# Patient Record
Sex: MALE | Race: WHITE
[De-identification: names, ages, dates, MRNs, and addresses within clinical notes are randomized per-mention and may not be internally consistent; named-entity substitution may affect disease eponyms.]

---

## 2019-07-03 ENCOUNTER — HOSPITAL ENCOUNTER (EMERGENCY)
Dept: HOSPITAL 75 - ER | Age: 29
Discharge: HOME | End: 2019-07-03
Payer: COMMERCIAL

## 2019-07-03 VITALS — WEIGHT: 185 LBS | BODY MASS INDEX: 29.03 KG/M2 | HEIGHT: 67 IN

## 2019-07-03 VITALS — DIASTOLIC BLOOD PRESSURE: 89 MMHG | SYSTOLIC BLOOD PRESSURE: 123 MMHG

## 2019-07-03 DIAGNOSIS — S80.212A: ICD-10-CM

## 2019-07-03 DIAGNOSIS — S40.811A: ICD-10-CM
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
## 2019-07-03 DIAGNOSIS — S06.0X0A: Primary | ICD-10-CM

## 2019-07-03 DIAGNOSIS — S80.211A: ICD-10-CM

## 2019-07-03 DIAGNOSIS — F17.210: ICD-10-CM

## 2019-07-03 DIAGNOSIS — R40.2142: ICD-10-CM

## 2019-07-03 DIAGNOSIS — S50.811A: ICD-10-CM

## 2019-07-03 DIAGNOSIS — S30.811A: ICD-10-CM

## 2019-07-03 DIAGNOSIS — S60.512A: ICD-10-CM

## 2019-07-03 DIAGNOSIS — R40.2362: ICD-10-CM

## 2019-07-03 DIAGNOSIS — R40.2252: ICD-10-CM

## 2019-07-03 PROCEDURE — 90715 TDAP VACCINE 7 YRS/> IM: CPT

## 2019-07-03 PROCEDURE — 72125 CT NECK SPINE W/O DYE: CPT

## 2019-07-03 PROCEDURE — 70450 CT HEAD/BRAIN W/O DYE: CPT

## 2019-07-03 PROCEDURE — 73610 X-RAY EXAM OF ANKLE: CPT

## 2019-07-03 NOTE — XMS REPORT
[facility]

                             Created on: 2015



Paul Hillman

External Reference #: [id number]

: 1990

Sex: Male



Demographics







                          Address                   [address] [place] [address]  [address]

 

                          Home Phone                [phone]

 

                          Preferred Language        Unknown

 

                          Marital Status            Unknown

 

                          Alevism Affiliation     Unknown

 

                          Race                      White

 

                          Ethnic Group              Not  or 





Author







                          Author                    KARTHIK LONG

 

                          [facility] [place]              [employer]

 

                          Address                   Unknown

 

                          Phone                     Unavailable







Care Team Providers







                    Care Team Member Name    Role                Phone

 

                    KARTHIK LONG       CP                  Unavailable



                                                                



Allergies, Adverse Reactions, Alerts

          





                    Substance           Reaction            Event Type

 

                    Wellbutrin Sr 150 Mg Tablet Extended Release    "panic attack"      Non Drug Allergy



                                                                                
       



Problems

          





             Problem Type    Condition    ICD-9 Code    Onset Dates    Condition Status

 

             Problem      Encounter for long-term (current) use of other medications    V58.69                    

Active

 

             Problem      Acute pharyngitis    462                       Active

 

             Problem      Depressive disorder, not elsewhere classified    311                       Active

 

             Problem      Problems related to high-risk sexual behavior    V69.2                     Active

 

             Problem      Esophageal reflux    530.81                    Active

 

             Problem      Urethral discharge    788.7                     Active

 

             Problem      Unspecified sinusitis (chronic)    473.9                     Active

 

             Problem      Other diseases of nasal cavity and sinuses    478.19                    Active

 

                    Problem             Other and unspecified noninfectious gastroenteritis and colitis    558.9 

                                                    Active

 

             Problem      Unspecified acute conjunctivitis    372.00                    Active

 

             Assessment    Sinusitis    473.9                     Active

 

             Assessment    Allergic rhinitis    477.9                     Active

 

             Problem      Streptococcal sore throat    034.0                     Active

 

             Assessment    Heartburn    787.1                     Active

 

             Problem      Unspecified acute nonsuppurative otitis media    381.00                    Active



                                                                                
                                                                                
                                                                  



Medications

          





        Medication    Code System    Code    Instructions    Start Date    End Date    Status    Dosage



 

           Azithromycin    NDC        [address]-65    250 MG Orally Once a day    Aug 27, 2015    Sept 01,

 2015                                               2 tablets  on the first day, then 1 tablet daily for 4 days

 

           PrednisoLONE    NDC        [address]-52    10 mg Orally 2 times a day    Aug 27, 2015    Sept

 01, 2015                                           as directed

 

             Omeprazole Magnesium    NDC          [address]31    20 MG Orally Once a day    2013 

                    Dec 17, 2013                            take 1 capsule by Oral route 1 time per day



                                                                                
                           



Procedures

          





                Procedure       Coding System    Code            Date

 

                Office Visit, Est Pt., Level 3    CPT-4           46173           Aug 27, 2015



                                                                                
                 



Vital Signs

          





                          Date/Time:                Aug 27, 2015

 

                          Temperature               98.6 F

 

                          Weight                    171.2 lbs

 

                          Height                    67 in

 

                          BMI                       26.81 Index

 

                          Blood Pressure Diastolic    80 mmHg

 

                          Blood Pressure Systolic    132 mmHg

 

                          Cardiac Monitoring Heart Rate    84 bpm



                                                                              



Results

          No Known Results                                                      
             



Summary Purpose

          eClinicalWorks Submission

## 2019-07-03 NOTE — XMS REPORT
[facility]

                             Created on: 2018



Paul Hillman

External Reference #: [id number]

: 1990

Sex: Male



Demographics







                          Address                   [address] [place]  [address]

 

                          Preferred Language        Unknown

 

                          Marital Status            Unknown

 

                          Faith Affiliation     Unknown

 

                          Race                      Unknown

 

                          Ethnic Group              Unknown





Author







                          Author                    VINAYAK DELACRUZ

 

                          Organization              [facility]

 

                          Address                   [address] [place]  [address]



 

                          Phone                     [phone]







Care Team Providers







                    Care Team Member Name    Role                Phone

 

                    VINAYAK DELACRUZ     Unavailable         [phone]







PROBLEMS

Unknown Problems



ALLERGIES







             Substance    Reaction     Event Type    Date         Status

 

                Wellbutrin Sr 150 Mg Tablet Extended Release    "panic attack"    Non Drug Allergy    

                            Active







ENCOUNTERS







                Encounter       Location        Date            Diagnosis

 

                          [address] [address]

                                        Spasm of thoracic back muscle M62.830

 

                          [facility]    [address] [address]

                                        Gastroenteritis K52.9

 

                          [address] [address]

                          19 Dec, 2017              Cough R05

 

                          [address] [address]

                                        Acute nasopharyngitis J00

 

                          [place] [address] [address]

                          28 Aug, 2017              Left upper quadrant pain R10.12

 

                          [address] [address]

                          02 Mar, 2017              Gastroenteritis K52.9 and Gastroesophageal reflux disease without

 esophagitis K21.9

 

                          [address] [address]

                                        Fever, unspecified fever cause R50.9 ; Sore throat J02.9 and Influenza

 J11.1

 

                          [address] [address]

                          11 Aug, 2016              Acute mucoid otitis media of right ear H65.111 ; Infected lesion 

in nose J34.89 and Gastroesophageal reflux disease without esophagitis K21.9

 

                          [facility] [place] [facility]    [address] [place] [address]

                          16 May, 2016              Acute non-recurrent frontal sinusitis J01.10

 

                          [facility]     [id number] [place] [address]

                          27 Aug, 2015              Allergic rhinitis 477.9 ; Sinusitis 473.9 and Heartburn 787.1

 

                          [facility]     [id number] [place] [address]

                                        Gastroenteritis 558.9

 

                          [facility]     [address] [address]

                                         

 

                          [facility]     [id number] [place] [address]

                                         

 

                          [facility]     [id number] [place] [address]

                          10 Mar, 2015               

 

                          [facility]     [id number] [place] [address]

                          10 Mar, 2015               

 

                          [facility]     [id number] [place] [address]

                          09 Dec, 2014               

 

                          [facility]     [id number] [place] [address]

                          09 Dec, 2014               

 

                          [facility]     [id number] [place] [address]

                          22 Oct, 2014               

 

                          [facility]     [id number] [place] [address]

                          22 Oct, 2014               

 

                          [facility]     [id number] [place] [address]

                                         

 

                          [facility]     [id number] [place] [address]

                                         

 

                          [facility]     [id number] [place] [address]

                                         

 

                          [facility] [place] [facility]     [address] [id number] [place] [address]

                                         

 

                          [facility] [place] [facility]     [address] [id number] [place] [address]

                                         

 

                          [facility] [place] [facility]     [address] [id number] [place] [address]

                          16 2014               

 

                          [facility] [place] [facility]     [address] [id number] [place] [address]

                          15 2014               

 

                          [facility] [place] [facility]     [address] [id number] [place] [address]

                          15 2014               

 

                          [facility] [place] [facility]     [address] [id number] [place] [address]

                          13 2014               

 

                          [facility] [place] [facility]     [address] [id number] [place] [address]

                          17 Dec, 2013               

 

                          [facility] [place] [facility]     [address] [id number] [place] [address]

                          17 Dec, 2013               

 

                          [facility] [place] [facility]     [address] [id number] [place] [address]

                          17 Sep, 2013               

 

                          [facility] [place] [facility]     [address] [id number] [place] [address]

                          09 Sep, 2013               

 

                          [facility] [place] [facility]     [address] [id number] [place] [address]

                          07 Sep, 2013               

 

                          [facility] [place] [facility]     [address] [id number] [place] [address]

                          03 Sep, 2013               

 

                          [facility] [place] [facility]     [address] [id number] [place] [address]

                          02 Sep, 2013               

 

                          [facility] [place] [facility]     [address] [id number] [place] [address]

                          28 Aug, 2013               

 

                          [facility] [place] [facility]     [address] [id number] [place] [address]

                          27 Aug, 2013               

 

                          [facility] [place] [facility]     [address] [id number] [place] [address]

                          10 May, 2013               

 

                          [facility]     [address] [id number] [place] [address]

                                         

 

                          [facility] [place] [facility]     [address] [id number] [place] [address]

                                         

 

                          [facility]     [address] [id number] [place] [address]

                                         

 

                          [facility]     [address] [id number] [place] [address]

                          29 Oct, 2012               

 

                          [facility]     [address] [id number] [place] [address]

                          29 Oct, 2012               

 

                          [facility]     [address] [id number] [place] [address]

                          22 Oct, 2012               

 

                          [facility]     [address] [id number] [place] [address]

                          22 Oct, 2012               

 

                          [facility]     [address] [id number] [place] [address]

                          15 Oct, 2012               

 

                          [facility]     [address] [id number] [place] [address]

                          15 Oct, 2012               

 

                          [facility]     [address] [id number] [place] [address]

                          24 Sep, 2012               

 

                          [facility] [place] [facility]     [address] [id number] [place] [address]

                          15 Sep, 2012               

 

                          [facility]     [address] [id number] [place] [address]

                          12 Sep, 2012               

 

                          [facility] [place] [facility]     [address] [id number] [place] [address]

                          10 Sep,                

 

                          [facility] [place] [facility]     [address] [id number] [place] [address]

                          09 Sep,                

 

                          [facility] [place] [facility]     [address] [id number] [place] [address]

                          06 Sep, 2012               

 

                          [facility]     [id number] [address]

                          04 Aug, 2012               

 

                          [facility]     [address] [address]

                                         

 

                          [facility]     [address] [address]

                                         

 

                          [facility]     [address] [address]

                                         

 

                          [facility]     [address] [address]

                                         

 

                          [facility]     [address] [place] [address]

                                         

 

                          [facility]     [id number] [address]

                                         

 

                          [facility]     [id number] [place] [address]

                          29 Dec, 2010               

 

                          [facility]     [address] [address]

                          04 Dec, 2010               

 

                          [facility]     [address] [address]

                                         

 

                          [facility]     [id number] [address]

                          16 Aug, 2010               







IMMUNIZATIONS

No Known Immunizations



SOCIAL HISTORY

Never Assessed



REASON FOR VISIT

Pain (acute) back-LUIGI irizarry



PLAN OF CARE







                          Activity                  Details









                                         









                          Follow Up                 prn Reason:







VITAL SIGNS







                    Height              67 in               2018

 

                    Weight              174.5 lbs           2018

 

                    Temperature         98.1 degrees Fahrenheit    2018

 

                    Heart Rate          90 bpm              2018

 

                    Respiratory Rate    22                  2018

 

                    BMI                 27.33 kg/m2         2018

 

                    Blood pressure systolic    110 mmHg            2018

 

                    Blood pressure diastolic    82 mmHg             2018







MEDICATIONS







        Medication    Instructions    Dosage    Frequency    Start Date    End Date    Duration    Status



 

             Tramadol HCl 50 mg    Orally every 6 hrs    1-2 tablets  as needed    6h           

                                                            Active

 

                    Ibuprofen 800 MG    Orally Three times a day    1 tablet with food or milk as needed

             8h                                         Active

 

             Cyclobenzaprine HCl 10 mg    Orally 2 times a day    1 tablet as needed    12h                                                                 Active







RESULTS

No Results



PROCEDURES

No Known procedures



INSTRUCTIONS





MEDICATIONS ADMINISTERED

No Known Medications



MEDICAL (GENERAL) HISTORY







                    Type                Description         Date

 

                    Medical History     Esophageal reflux     

 

                    Medical History     Esophageal reflux     

 

                    Hospitalization History    pneumonia-as child
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
## 2019-07-03 NOTE — XMS REPORT
[facility]

                             Created on: 2019



Paul Hillman

External Reference #: [id number]

: 1990

Sex: Male



Demographics







                          Address                   [address] [place]  [address]

 

                          Preferred Language        Unknown

 

                          Marital Status            Unknown

 

                          Restorationist Affiliation     Unknown

 

                          Race                      Unknown

 

                          Ethnic Group              Unknown





Author







                          Author                    Migration,  Doctor

 

                          Organization              [facility]

 

                          Address                   Unknown

 

                          Phone                     Unavailable







Care Team Providers







                    Care Team Member Name    Role                Phone

 

                    Migration,  Doctor    Unavailable         Unavailable







PROBLEMS







          Type      Condition    ICD9-CM Code    HML98-DU Code    Onset Dates    Condition Status    SNOMED

 Code

 

          Problem    Sinusitis              J32.9               Active    43387143







ALLERGIES

No Information



ENCOUNTERS







                Encounter       Location        Date            Diagnosis

 

                          [facility] IN [facility]    [address] [address]

                                        Sinusitis J32.9

 

                          [facility] WALK IN [place] [address] [address]

                                        Acute sinusitis J01.90 ; Sore throat J02.9 and Flu-like symptoms 

R68.89

 

                          [place] [address] [address]

                          11 Sep, 2018              Acute bronchitis, unspecified organism J20.9

 

                          [place] [address] [address]

                                        Spasm of thoracic back muscle M62.830

 

                          [facility] [place] [facility]    [address] [address]

                                        Gastroenteritis K52.9

 

                          [place] [address] [address]

                          19 Dec, 2017              Cough R05

 

                          [place] [address] [address]

                                        Acute nasopharyngitis J00

 

                          [place] [address] [address]

                          28 Aug, 2017              Left upper quadrant pain R10.12

 

                          [place] [address] [address]

                          02 Mar, 2017              Gastroenteritis K52.9 and Gastroesophageal reflux disease without

 esophagitis K21.9

 

                          [facility]     [address] [address] [id number] [place] [address]

                                        Fever, unspecified fever cause R50.9 ; Sore throat J02.9 and Influenza

 J11.1

 

                          [facility]     [id number] [place] [address]

                          11 Aug, 2016              Acute mucoid otitis media of right ear H65.111 ; Infected lesion 

in nose J34.89 and Gastroesophageal reflux disease without esophagitis K21.9

 

                          [facility] [place] [facility]    [id number] [place] [address]

                          16 May, 2016              Acute non-recurrent frontal sinusitis J01.10

 

                          [facility]     [id number] [place] [address]

                          27 Aug, 2015              Allergic rhinitis 477.9 ; Sinusitis 473.9 and Heartburn 787.1

 

                          [facility]     [id number] [place] [address]

                                        Gastroenteritis 558.9

 

                          [facility]     [address] [address]

                          14 2015               

 

                          [facility]     [id number] [place] [address]

                                         

 

                          [facility]     [id number] [place] [address]

                          10 Mar, 2015               

 

                          [facility]     [id number] [place] [address]

                          10 Mar, 2015               

 

                          [facility]     [id number] [place] [address]

                          09 Dec, 2014               

 

                          [facility]     [id number] [place] [address]

                          09 Dec, 2014               

 

                          [facility]     [address] [id number] [place] [address]

                          22 Oct, 2014               

 

                          [facility]     [address] [id number] [place] [address]

                          22 Oct, 2014               

 

                          [facility] [place] [facility]     [address] [id number] [place] [address]

                                         

 

                          [facility] [place] [facility]     [address] [id number] [place] [address]

                          14 2014               

 

                          [facility] [place] [facility]     [address] [id number] [place] [address]

                                         

 

                          [facility] [place] [facility]     [address] [id number] [place] [address]

                                         

 

                          [facility] [place] [facility]     [address] [id number] [place] [address]

                          16 2014               

 

                          [facility] [place] [facility]     [address] [id number] [place] [address]

                          16 2014               

 

                          [facility] [place] [facility]     [address] [id number] [place] [address]

                          15 2014               

 

                          [facility] [place] [facility]     [address] [id number] [place] [address]

                          15 2014               

 

                          [facility] [place] [facility]     [address] [id number] [place] [address]

                          13 2014               

 

                          [facility] [place] [facility]     [address] [id number] [place] [address]

                          17 Dec, 2013               

 

                          [facility] [place] [facility]     [address] [id number] [place] [address]

                          17 Dec, 2013               

 

                          [facility] [place] [facility]     [address] [id number] [place] [address]

                          17 Sep, 2013               

 

                          [facility] [place] [facility]     [address] [id number] [place] [address]

                          09 Sep, 2013               

 

                          [facility] [place] [facility]     [address] [id number] [place] [address]

                          07 Sep, 2013               

 

                          [facility] [place] [facility]     [address] [id number] [place] [address]

                          03 Sep, 2013               

 

                          [facility] [place] [facility]     [address] [id number] [place] [address]

                          02 Sep, 2013               

 

                          [facility] [place] [facility]     [address] [id number] [place] [address]

                          28 Aug, 2013               

 

                          [facility] [place] [facility]     [address] [id number] [place] [address]

                          27 Aug, 2013               

 

                          [facility] [place] [facility]     [address] [id number] [place] [address]

                          10 May, 2013               

 

                          [facility] [place] [facility]     [address] [id number] [place] [address]

                                         

 

                          [facility] [place] [facility]     [address] [id number] [place] [address]

                                         

 

                          [facility] [place] [facility]     [address] [id number] [place] [address]

                                         

 

                          [facility] [place] [facility]     [address] [id number] [place] [address]

                          29 Oct, 2012               

 

                          [facility] [place] [facility]     [address] [id number] [place] [address]

                          29 Oct, 2012               

 

                          [facility] [place] [facility]     [address] [id number] [place] [address]

                          22 Oct, 2012               

 

                          [facility] [place] [facility]     [address] [id number] [place] [address]

                          22 Oct, 2012               

 

                          [facility] [place] [facility]     [address] [id number] [address]

                          15 Oct, 2012               

 

                          [facility] [place] [facility]     [address] [id number] [place] [address]

                          15 Oct, 2012               

 

                          [facility] [place] [facility]     [address] [id number] [place] [address]

                          24 Sep, 2012               

 

                          [facility] [place] [facility]     [address] [id number] [place] [address]

                          15 Sep, 2012               

 

                          [facility] [place] [facility]     [address] [address]

                          12 Sep, 2012               

 

                          [facility]     [address] [facility] [id number] [place] [address]

                          10 Sep, 2012               

 

                          [facility]     [address] [facility] [id number] [address]

                          09 Sep, 2012               

 

                          [facility]     [id number] [address]

                          06 Sep, 2012               

 

                          [facility]     [address] [facility] [id number] [place] [address]

                          04 Aug, 2012               

 

                          [facility]     [address] [place] [address]

                                         

 

                          [facility]     [id number] [address]

                                         

 

                          [facility]     [address] [place] [address]

                                         

 

                          [facility]     [id number] [address]

                                         

 

                          [facility]     [id number] [address]

                                         

 

                          [facility]     [id number] [address]

                                         

 

                          [facility]     [id number] [address]

                          29 Dec, 2010               

 

                          [facility]     [id number] [address]

                          04 Dec, 2010               

 

                          [facility]     [id number] [address]

                                         

 

                          [facility]     [id number] [address]

                          16 Aug, 2010               







IMMUNIZATIONS

No Known Immunizations



SOCIAL HISTORY

Never Assessed



REASON FOR VISIT

[facility]



PLAN OF CARE





VITAL SIGNS





MEDICATIONS

Unknown Medications



RESULTS

No Results



PROCEDURES

No Known procedures



INSTRUCTIONS





MEDICATIONS ADMINISTERED

No Known Medications



MEDICAL (GENERAL) HISTORY







                    Type                Description         Date

 

                    Medical History     Esophageal reflux     

 

                    Medical History     Esophageal reflux     

 

                    Surgical History    No Surgical history information     

 

                    Hospitalization History    pneumonia-as child

## 2019-07-03 NOTE — XMS REPORT
[facility]

                             Created on: 10/03/2017



Paul Hillman

External Reference #: [id number]

: 1990

Sex: Male



Demographics







                          Address                   [address] [place]  [address]

 

                          Preferred Language        Unknown

 

                          Marital Status            Unknown

 

                          Taoist Affiliation     Unknown

 

                          Race                      Unknown

 

                          Ethnic Group              Unknown





Author







                          Author                    VINAYAK DELACRUZ

 

                          Organization              [facility]

 

                          Address                   [address] [place]  [address]



 

                          Phone                     [phone]







Care Team Providers







                    Care Team Member Name    Role                Phone

 

                    VINAYAK DELACRUZ     Unavailable         [phone]







PROBLEMS







          Type      Condition    ICD9-CM Code    MUU61-AF Code    Onset Dates    Condition Status    SNOMED

 Code

 

           Problem    Gastroesophageal reflux disease without esophagitis               K21.9                 Active

                                        [id number]

 

          Problem    Acute mucoid otitis media of right ear              H65.111              Active    41477898



 

          Problem    Infected lesion in nose              J34.89              Active    [id number]







ALLERGIES







             Substance    Reaction     Event Type    Date         Status

 

                Wellbutrin Sr 150 Mg Tablet Extended Release    "panic attack"    Non Drug Allergy    

02 Mar, 2017                            Active







SOCIAL HISTORY

Never Assessed



PLAN OF CARE







                          Activity                  Details









                                         









                          Follow Up                 prn Reason:







VITAL SIGNS







                    Height              67 in               2017

 

                    Weight              178.0 lbs           2017

 

                    Temperature         98.6 degrees Fahrenheit    2017

 

                    Heart Rate          73 bpm              2017

 

                    Respiratory Rate    22                  2017

 

                    BMI                 27.88 kg/m2         2017

 

                    Blood pressure systolic    110 mmHg            2017

 

                    Blood pressure diastolic    77 mmHg             2017







MEDICATIONS







        Medication    Instructions    Dosage    Frequency    Start Date    End Date    Duration    Status



 

             Zofran ODT 8 MG    Orally every 4-6 hours as needed    as directed                 02 Mar, 2017 

                                        2 days              Active

 

                    Flonase 50 mcg/actuation                        1 sprays by Nasal route 2 times per day in each nostril

                          10 Mar, 2015                 90 days      Active

 

                    Zofran ODT 8 MG     Orally 3 times a day prn    1 tablet on the tongue and allow to dissolve

                                           2 days       Active







RESULTS

No Results



PROCEDURES

No Known procedures



IMMUNIZATIONS

No Known Immunizations



MEDICAL (GENERAL) HISTORY







                    Type                Description         Date

 

                    Medical History     Esophageal reflux     

 

                    Medical History     Esophageal reflux     

 

                    Hospitalization History    pneumonia-as child

## 2019-07-03 NOTE — XMS REPORT
[facility]

                             Created on: 2017



Paul Hillman

External Reference #: [id number]

: 1990

Sex: Male



Demographics







                          Address                   [address] [place]  [address]

 

                          Preferred Language        Unknown

 

                          Marital Status            Unknown

 

                          Restorationism Affiliation     Unknown

 

                          Race                      Unknown

 

                          Ethnic Group              Unknown





Author







                          Author                    LUNA KARIMI

 

                          Organization              [facility]

 

                          Address                   [address] [place]  [address]



 

                          Phone                     [phone]







Care Team Providers







                    Care Team Member Name    Role                Phone

 

                    LUNA KARIMI    Unavailable         [phone]







PROBLEMS







          Type      Condition    ICD9-CM Code    SIW52-YR Code    Onset Dates    Condition Status    SNOMED

 Code

 

           Problem    Gastroesophageal reflux disease without esophagitis               K21.9                 Active

                                        [id number]

 

          Problem    Acute mucoid otitis media of right ear              H65.111              Active    44340607



 

          Problem    Infected lesion in nose              J34.89              Active    [id number]







ALLERGIES







             Substance    Reaction     Event Type    Date         Status

 

                Wellbutrin Sr 150 Mg Tablet Extended Release    "panic attack"    Non Drug Allergy    

                            Active







SOCIAL HISTORY

Never Assessed



PLAN OF CARE







                          Activity                  Details









                                         









                          Follow Up                 prn Reason:







VITAL SIGNS







                    Height              67 in               2017

 

                    Weight              180.1 lbs           2017

 

                    Temperature         99.0 degrees Fahrenheit    2017

 

                    Heart Rate          92 bpm              2017

 

                    Respiratory Rate    18                  2017

 

                    BMI                 28.20 kg/m2         2017

 

                    Blood pressure systolic    130 mmHg            2017

 

                    Blood pressure diastolic    78 mmHg             2017







MEDICATIONS

No Known Medications



RESULTS







                Name            Result          Date            Reference Range

 

                INFLUENZA A & B (IN HOUSE)                    2017       

 

                INFLUENZA A     Negative                         

 

                INFLUENZA B     Positive                         

 

                Control         +                                

 

                Lot #           [id number]                          

 

                Exp date        2018                        

 

                STREP A (IN HOUSE)                    2017       

 

                STREP A         negative                         

 

                Control         +                                

 

                Lot #           [id number]                           

 

                Exp date        2018                       







PROCEDURES







                Procedure       Date Ordered    Result          Body Site

 

                STREP A ASSAY W/OPTIC    2017                     

 

                INFLUENZA ASSAY W/OPTIC    2017                     







IMMUNIZATIONS

No Known Immunizations



MEDICAL (GENERAL) HISTORY







                    Type                Description         Date

 

                    Medical History     Esophageal reflux     

 

                    Medical History     Esophageal reflux     

 

                    Hospitalization History    pneumonia-as child

## 2019-07-03 NOTE — XMS REPORT
[facility]

                             Created on: 2019



Paul Hillman

External Reference #: [id number]

: 1990

Sex: Male



Demographics







                          Address                   [address] [place]  [address]

 

                          Preferred Language        Unknown

 

                          Marital Status            Unknown

 

                          Temple Affiliation     Unknown

 

                          Race                      Unknown

 

                          Ethnic Group              Unknown





Author







                          Author                    Migration,  Doctor

 

                          Organization              [facility]

 

                          Address                   Unknown

 

                          Phone                     Unavailable







Care Team Providers







                    Care Team Member Name    Role                Phone

 

                    Migration,  Doctor    Unavailable         Unavailable







PROBLEMS







          Type      Condition    ICD9-CM Code    BLJ97-FG Code    Onset Dates    Condition Status    SNOMED

 Code

 

          Problem    Sinusitis              J32.9               Active    [id number]







ALLERGIES

No Information



ENCOUNTERS







                Encounter       Location        Date            Diagnosis

 

                          [facility] WALK IN [facility]    [address] [address]

                                        Sinusitis J32.9

 

                          [facility] WALK IN [facility]    [address] [address]

                                        Acute sinusitis J01.90 ; Sore throat J02.9 and Flu-like symptoms 

R68.89

 

                          [place] [address] [address]

                          11 Sep, 2018              Acute bronchitis, unspecified organism J20.9

 

                          [place] [address] [address]

                                        Spasm of thoracic back muscle M62.830

 

                          [facility] [place] [facility]    [address] [address]

                                        Gastroenteritis K52.9

 

                          [place] [address] [address]

                          19 Dec, 2017              Cough R05

 

                          [place] [address] [address]

                                        Acute nasopharyngitis J00

 

                          [place] [address] [address]

                          28 Aug, 2017              Left upper quadrant pain R10.12

 

                          [place] [address] [address]

                          02 Mar, 2017              Gastroenteritis K52.9 and Gastroesophageal reflux disease without

 esophagitis K21.9

 

                          [facility]     [id number] [place] [address]

                                        Fever, unspecified fever cause R50.9 ; Sore throat J02.9 and Influenza

 J11.1

 

                          [facility]     [id number] [place] [address]

                          11 Aug, 2016              Acute mucoid otitis media of right ear H65.111 ; Infected lesion 

in nose J34.89 and Gastroesophageal reflux disease without esophagitis K21.9

 

                          [facility] [place] [facility]    [id number] [place] [address]

                          16 May, 2016              Acute non-recurrent frontal sinusitis J01.10

 

                          [facility]     [id number] [place] [address]

                          27 Aug, 2015              Allergic rhinitis 477.9 ; Sinusitis 473.9 and Heartburn 787.1

 

                          [facility]     [id number] [place] [address]

                                        Gastroenteritis 558.9

 

                          [facility]     [address] [address]

                          14 2015               

 

                          [facility]     [id number] [place] [address]

                                         

 

                          [facility]     [id number] [place] [address]

                          10 Mar, 2015               

 

                          [facility]     [id number] [place] [address]

                          10 Mar, 2015               

 

                          [facility]     [id number] [place] [address]

                          09 Dec, 2014               

 

                          [facility]     [id number] [place] [address]

                          09 Dec, 2014               

 

                          [facility]     [id number] [place] [address]

                          22 Oct, 2014               

 

                          [facility]     [address] [id number] [place] [address]

                          22 Oct, 2014               

 

                          [facility] [place] [facility]     [address] [id number] [place] [address]

                                         

 

                          [facility] [place] [facility]     [address] [id number] [place] [address]

                          14 2014               

 

                          [facility] [place] [facility]     [address] [id number] [place] [address]

                                         

 

                          [facility] [place] [facility]     [address] [id number] [place] [address]

                                         

 

                          [facility] [place] [facility]     [address] [id number] [place] [address]

                          16 2014               

 

                          [facility] [place] [facility]     [address] [id number] [place] [address]

                          16 2014               

 

                          [facility] [place] [facility]     [address] [id number] [place] [address]

                          15 2014               

 

                          [facility] [place] [facility]     [address] [id number] [place] [address]

                          15 2014               

 

                          [facility] [place] [facility]     [address] [id number] [place] [address]

                          13 2014               

 

                          [facility] [place] [facility]     [address] [id number] [place] [address]

                          17 Dec, 2013               

 

                          [facility] [place] [facility]     [address] [id number] [place] [address]

                          17 Dec, 2013               

 

                          [facility] [place] [facility]     [address] [id number] [place] [address]

                          17 Sep, 2013               

 

                          [facility] [place] [facility]     [address] [id number] [place] [address]

                          09 Sep, 2013               

 

                          [facility] [place] [facility]     [address] [id number] [place] [address]

                          07 Sep, 2013               

 

                          [facility] [place] [facility]     [address] [id number] [place] [address]

                          03 Sep, 2013               

 

                          [facility] [place] [facility]     [address] [id number] [place] [address]

                          02 Sep, 2013               

 

                          [facility] [place] [facility]     [address] [id number] [place] [address]

                          28 Aug, 2013               

 

                          [facility] [place] [facility]     [address] [id number] [place] [address]

                          27 Aug, 2013               

 

                          [facility] [place] [facility]     [address] [id number] [place] [address]

                          10 May, 2013               

 

                          [facility] [place] [facility]     [address] [id number] [place] [address]

                                         

 

                          [facility] [place] [facility]     [address] [id number] [place] [address]

                                         

 

                          [facility] [place] [facility]     [address] [id number] [place] [address]

                                         

 

                          [facility] [place] [facility]     [address] [id number] [place] [address]

                          29 Oct, 2012               

 

                          [facility] [place] [facility]     [address] [id number] [place] [address]

                          29 Oct, 2012               

 

                          [facility] [place] [facility]     [address] [id number] [place] [address]

                          22 Oct, 2012               

 

                          [facility] [place] [facility]     [address] [id number] [place] [address]

                          22 Oct, 2012               

 

                          [facility] [place] [facility]     [address] [id number] [place] [address]

                          15 Oct, 2012               

 

                          [facility] [place] [facility]     [address] [id number] [place] [address]

                          15 Oct, 2012               

 

                          [facility] [place] [facility]     [address] [id number] [place] [address]

                          24 Sep, 2012               

 

                          [facility] [place] [facility]     [address] [id number] [place] [address]

                          15 Sep, 2012               

 

                          [facility] [place] [facility]     [id number] [address]

                          12 Sep, 2012               

 

                          [facility]     [address] [facility]note 3[facility] [id number] [place] [address]

                          10 Sep, 2012               

 

                          [facility]     [address] [facility]note 3] [id number] [place] [address]

                          09 Sep, 2012               

 

                          [facility]     [id number] [address]

                          06 Sep, 2012               

 

                          [facility]     [address] [facility]note 3] [id number] [place] [address]

                          04 Aug, 2012               

 

                          [facility]     [address] [place] [address]

                                         

 

                          [facility]     [id number] [address]

                                         

 

                          [facility]     [address] [place] [address]

                                         

 

                          [facility]     [id number] [address]

                                         

 

                          [facility]     [id number] [address]

                                         

 

                          [facility]     [id number] [address]

                                         

 

                          [facility]     [id number] [address]

                          29 Dec, 2010               

 

                          [facility]     [id number] [address]

                          04 Dec, 2010               

 

                          [facility]     [id number] [address]

                                         

 

                          [facility]     [id number] [address]

                          16 Aug, 2010               







IMMUNIZATIONS

No Known Immunizations



SOCIAL HISTORY

Never Assessed



REASON FOR VISIT

[facility] [place]





VITAL SIGNS





MEDICATIONS

Unknown Medications



RESULTS

No Results



PROCEDURES

No Known procedures



INSTRUCTIONS





MEDICATIONS ADMINISTERED

No Known Medications



MEDICAL (GENERAL) HISTORY







                    Type                Description         Date

 

                    Medical History     Esophageal reflux     

 

                    Medical History     Esophageal reflux     

 

                    Surgical History    No Surgical history information     

 

                    Hospitalization History    pneumonia-as child
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
## 2019-07-03 NOTE — XMS REPORT
[facility]

                             Created on: 2019



Paul Hillman

External Reference #: [id number]

: 1990

Sex: Male



Demographics







                          Address                   [address] [place] [address]

 

                          Preferred Language        Unknown

 

                          Marital Status            Unknown

 

                          Worship Affiliation     Unknown

 

                          Race                      Unknown

 

                          Ethnic Group              Unknown





Author







                          Author                    Migration,  Doctor

 

                          Organization              [facility]

 

                          Address                   Unknown

 

                          Phone                     Unavailable







Care Team Providers







                    Care Team Member Name    Role                Phone

 

                    Migration,  Doctor    Unavailable         Unavailable







PROBLEMS







          Type      Condition    ICD9-CM Code    EGM89-VY Code    Onset Dates    Condition Status    SNOMED

 Code

 

          Problem    Sinusitis              J32.9               Active    [id number]







ALLERGIES







             Substance    Reaction     Event Type    Date         Status

 

                Wellbutrin Sr 150 Mg Tablet Extended Release    "panic attack"    Non Drug Allergy    

                            Active







ENCOUNTERS







                Encounter       Location        Date            Diagnosis

 

                          [address] [address]

                          10 May, 2019              Irritant contact dermatitis due to plants, except food L24.7

 

                          [address] [address]

                                        Allergic contact dermatitis due to plants, except food L23.7

 

                          [facility]    [address] [place], [place] [id number] [place] [address]

                                        Sinusitis J32.9

 

                          [facility]    [address] [place] [address]

                                        Acute sinusitis J01.90 ; Sore throat J02.9 and Flu-like symptoms 

R68.89

 

                          [address] [address]

                          11 Sep, 2018              Acute bronchitis, unspecified organism J20.9

 

                          [address] [address]

                                        Spasm of thoracic back muscle M62.830

 

                          [facility]    [address] [place] [address]

                                        Gastroenteritis K52.9

 

                          [place] [address] [address]

                          19 Dec, 2017              Cough R05

 

                          [facility]     [id number] [place] [address]

                                        Acute nasopharyngitis J00

 

                          [facility]     [id number] [place] [address]

                          28 Aug, 2017              Left upper quadrant pain R10.12

 

                          [place] [id number] [place] [address]

                          02 Mar, 2017              Gastroenteritis K52.9 and Gastroesophageal reflux disease without

 esophagitis K21.9

 

                          [place] [id number] [place] [address]

                                        Fever, unspecified fever cause R50.9 ; Sore throat J02.9 and Influenza

 J11.1

 

                          [place] [id number] [place] [address]

                          11 Aug, 2016              Acute mucoid otitis media of right ear H65.111 ; Infected lesion 

in nose J34.89 and Gastroesophageal reflux disease without esophagitis K21.9

 

                          [facility]    [id number] [place] [address]

                          16 May, 2016              Acute non-recurrent frontal sinusitis J01.10

 

                          [place] [id number] [place] [address]

                          27 Aug, 2015              Allergic rhinitis 477.9 ; Sinusitis 473.9 and Heartburn 787.1

 

                          [place] [id number] [place] [address]

                                        Gastroenteritis 558.9

 

                          [place] [id number] [place] [address]

                                         

 

                          [facility]     [address] [address]

                          13 2015               

 

                          [facility]     [id number] [place] [address]

                          10 Mar, 2015               

 

                          [facility]     [address] [address]

                          10 Mar, 2015               

 

                          [facility] [place] [facility]     [address] [id number] [place] [address]

                          09 Dec, 2014               

 

                          [facility] [place] [facility]     [address] [id number] [place] [address]

                          09 Dec, 2014               

 

                          [facility] [place] [facility]     [address] [id number] [place] [address]

                          22 Oct, 2014               

 

                          [facility] [place] [facility]     [address] [id number] [place] [address]

                          22 Oct, 2014               

 

                          [facility] [place] [facility]     [address] [id number] [place] [address]

                                         

 

                          [facility] [place] [facility]     [address] [id number] [place] [address]

                                         

 

                          [facility] [place] [facility]     [address] [id number] [place] [address]

                                         

 

                          [facility] [place] [facility]     [address] [id number] [place] [address]

                                         

 

                          [facility] [place] [facility]     [address] [id number] [place] [address]

                                         

 

                          [facility] [place] [facility]     [address] [id number] [place] [address]

                                         

 

                          [facility] [place] [facility]     [address] [id number] [address]

                          15 Elver, 2014               

 

                          [facility] [place] [facility]     [address] [id number] [place] [address]

                          15 2014               

 

                          [facility] [place] [facility]     [address] [id number] [place] [address]

                                         

 

                          [facility] [place] [facility]     [address] [id number] [place] [address]

                          17 Dec, 2013               

 

                          [facility] [place] [facility]     [address] [id number] [place] [address]

                          17 Dec, 2013               

 

                          [facility] [place] [facility]     [address] [id number] [place] [address]

                          17 Sep,                

 

                          [facility]     [address] [id number] [place] [address]

                          09 Sep,                

 

                          [facility] [place] [facility]     [address] [id number] [place] [address]

                          07 Sep,                

 

                          [facility]     [address] [id number] [place] [address]

                          03 Sep,                

 

                          [facility]     [address] [id number] [place] [address]

                          02 Sep,                

 

                          [facility]     [address] [id number] [place] [address]

                          28 Aug, 2013               

 

                          [facility]     [address] [id number] [place] [address]

                          27 Aug, 2013               

 

                          [facility]     [address] [id number] [place] [address]

                          10 May, 2013               

 

                          [facility]     [address] [id number] [place] [address]

                                         

 

                          [facility]     [address] [id number] [place] [address]

                                         

 

                          [facility]     [address] [id number] [place] [address]

                                         

 

                          [facility]     [address] [id number] [place] [address]

                          29 Oct, 2012               

 

                          [facility]     [address] [id number] [place] [address]

                          29 Oct, 2012               

 

                          [facility] [place] [facility]     [address] [id number] [place] [address]

                          22 Oct, 2012               

 

                          [facility]     [address] [id number] [place] [address]

                          22 Oct, 2012               

 

                          [facility]     [address] [id number] [place] [address]

                          15 Oct, 2012               

 

                          [facility] [place] [facility]     [address] [id number] [place] [address]

                          15 Oct, 2012               

 

                          [facility] [place] [facility]     [address] [id number] [place] [address]

                          24 Sep, 2012               

 

                          [facility] [place] [facility]     [address] [id number] [place] [address]

                          15 Sep, 2012               

 

                          [facility] [place] [facility]     [address] [id number] [place] [address]

                          12 Sep, 2012               

 

                          [facility] [place] [facility]     [address] [id number] [place] [address]

                          10 Sep, 2012               

 

                          [facility] [place] [facility]     [address] [id number] [place] [address]

                          09 Sep, 2012               

 

                          [facility] [place] [facility]     [address] [id number] [place] [address]

                          06 Sep, 2012               

 

                          [facility] [place] [facility]     [address] [id number] [place] [address]

                          04 Aug, 2012               

 

                          [facility] [place] [facility]     [address] [id number] [place] [address]

                                         

 

                          [facility] [place] [facility]     [address] [id number] [place] [address]

                                         

 

                          [facility] [place] [facility]     [address] [id number] [place] [address]

                                         

 

                          [facility] [place] [facility]     [address] [id number] [place] [address]

                                         

 

                          [facility] [place] [facility]     [address] [id number] [place] [address]

                                         

 

                          [facility] [place] [facility]     [address] [id number] [place] [address]

                                         

 

                          [facility] [place] [facility]     [address] [id number] [place] [address]

                          29 Dec, 2010               

 

                          [facility] [place] [facility]     [address] [facility] [id number] [place] [address]

                          04 Dec, 2010               

 

                          [facility]     [address] [facility] [id number] [place] [address]

                                         

 

                          [facility]     [address] [facility] [id number] [place] [address]

                          16 Aug, 2010               







IMMUNIZATIONS

No Known Immunizations



SOCIAL HISTORY

Never Assessed



REASON FOR VISIT

[employer] [facility]



PLAN OF CARE





VITAL SIGNS





MEDICATIONS







        Medication    Instructions    Dosage    Frequency    Start Date    End Date    Duration    Status



 

             Cipro 500 mg                 1 tablet by Oral route every 12 hours for 7 day(s)                                                                         Active

 

                    Doxycycline Hyclate 100 mg                        take 1 tablet (100 mg) by oral route 2 times per 

day for 14 days                 15 Elver, 2014                              Active

 

                    Medrol (Paramjit) 4 mg                        take tapering dose of 6-1 Tablet by Oral route 1 per day. 

Take each days dose all at once daily.                 10 Mar, 2015                              Active

 

                    Cefuroxime Axetil 500 mg                        take 1 tablet by Oral route 2 times per day for 14 

days Do not miss any doses; finish full course                 17 Dec, 2013                              Active

 

             Amoxicillin 500 mg                 1 capsule by Oral route 3 times per day for 10 days                                                                   Active

 

                    Pred Forte 1 %                          use 1-2 Drops by Ophthalmic route 4 times per day for 7 days 

                          10 Mar, 2015                              Active

 

             Metronidazole 500 mg                 1 tablet by Oral route 2 times per day for 7 days                                                                   Active

 

                    Flonase 50 mcg/actuation                        1 sprays by Nasal route 2 times per day in each nostril

                          10 Mar, 2015                              Active

 

                Augmentin 875-125 mg                    1 tablet by Oral route 2 times per day for 7 day(s)      

                22 Oct, 2014                                    Active

 

                    Zithromax Z-Paramjit 250 mg                        2 tablet by Oral route 1 time per day for 1 days then

 take 1 tab daily on days 2-5                 10 Mar, 2015                              Active

 

                    Helidac 250-500-262.4 mg                        1 PKTs by Oral route 2 times per day for 10 day(s) 

                          04 Aug, 2012                              Active

 

                    Zyvox 600 mg                            take 1 tablet (600 mg) by oral route every 12 hours for 28 days

                                                        Active

 

                    Ciprofloxacin 750 mg                        take 1 tablet (750 mg) by oral route every 12 hours for

 1 month                  28 Aug, 2013                              Active







RESULTS

No Results



PROCEDURES

No Known procedures



INSTRUCTIONS





MEDICATIONS ADMINISTERED

No Known Medications



MEDICAL (GENERAL) HISTORY







                    Type                Description         Date

 

                    Medical History     Esophageal reflux     

 

                    Medical History     Esophageal reflux     

 

                    Surgical History    No Surgical history information     

 

                    Hospitalization History    pneumonia-as child

## 2019-07-03 NOTE — XMS REPORT
[facility]

                             Created on: 2018



Paul Hillman

External Reference #: [id number]

: 1990

Sex: Male



Demographics







                          Address                   [address] [place]  [address]

 

                          Preferred Language        Unknown

 

                          Marital Status            Unknown

 

                          Alevism Affiliation     Unknown

 

                          Race                      Unknown

 

                          Ethnic Group              Unknown





Author







                          Author                    OSIEL PLATA

 

                          Organization              [facility]

 

                          Address                   [address] [place]  [address]



 

                          Phone                     [phone]







Care Team Providers







                    Care Team Member Name    Role                Phone

 

                    OSIEL PLATA    Unavailable         [phone]







PROBLEMS

Unknown Problems



ALLERGIES







             Substance    Reaction     Event Type    Date         Status

 

                Wellbutrin Sr 150 Mg Tablet Extended Release    "panic attack"    Non Drug Allergy    

11 Sep, 2018                            Active







ENCOUNTERS







                Encounter       Location        Date            Diagnosis

 

                          [facility]     [address] [address]

                          11 Sep, 2018              Acute bronchitis, unspecified organism J20.9

 

                          [place] [address] [address]

                                        Spasm of thoracic back muscle M62.830

 

                          [facility]    [address] [address]

                                        Gastroenteritis K52.9

 

                          [place] [address] [address]

                          19 Dec, 2017              Cough R05

 

                          [place] [address] [address]

                                        Acute nasopharyngitis J00

 

                          [place] [address] [address]

                          28 Aug, 2017              Left upper quadrant pain R10.12

 

                          [place] [address] [address]

                          02 Mar, 2017              Gastroenteritis K52.9 and Gastroesophageal reflux disease without

 esophagitis K21.9

 

                          [place] [address] [address]

                                        Fever, unspecified fever cause R50.9 ; Sore throat J02.9 and Influenza

 J11.1

 

                          [facility]     [address], [place] [address]

                          11 Aug, 2016              Acute mucoid otitis media of right ear H65.111 ; Infected lesion 

in nose J34.89 and Gastroesophageal reflux disease without esophagitis K21.9

 

                          [facility]    [address] [address]

                          16 May, 2016              Acute non-recurrent frontal sinusitis J01.10

 

                          [facility]     [address] [place] [id number] [place] [address]

                          27 Aug, 2015              Allergic rhinitis 477.9 ; Sinusitis 473.9 and Heartburn 787.1

 

                          [facility]     [id number] [place] [address]

                                        Gastroenteritis 558.9

 

                          [facility]     [id number] [place] [address]

                          14 2015               

 

                          [facility]     [id number] [place] [address]

                                         

 

                          [facility]     [id number] [place] [address]

                          10 Mar, 2015               

 

                          [facility]     [id number] [place] [address]

                          10 Mar, 2015               

 

                          [facility]     [address] [address]

                          09 Dec, 2014               

 

                          [facility]     [id number] [place] [address]

                          09 Dec, 2014               

 

                          [facility]     [id number] [address]

                          22 Oct, 2014               

 

                          [facility]     [id number] [place] [address]

                          22 Oct, 2014               

 

                          [facility]     [id number] [address]

                                         

 

                          [facility]     [address] [place] [address]

                          14 2014               

 

                          [facility]     [address] [id number] [place] [address]

                          20 2014               

 

                          [facility] [place] [facility]     [address] [id number] [place] [address]

                          20 2014               

 

                          [facility]     [address] [id number] [place] [address]

                          16 2014               

 

                          [facility] [place] [facility]     [address] [id number] [place] [address]

                          16 2014               

 

                          [facility] [place] [facility]     [address] [id number] [place] [address]

                          15 2014               

 

                          [facility] [place] [facility]     [address] [id number] [place] [address]

                          15 2014               

 

                          [facility]     [address] [id number] [place] [address]

                                         

 

                          [facility] [place] [facility]     [address] [id number] [place] [address]

                          17 Dec, 2013               

 

                          [facility] [place] [facility]     [address] [id number] [place] [address]

                          17 Dec, 2013               

 

                          [facility] [place] [facility]     [address] [id number] [place] [address]

                          17 Sep, 2013               

 

                          [facility] [place] [facility]     [address] [id number] [place] [address]

                          09 Sep,                

 

                          [facility] [place] [facility]     [address] [id number] [place] [address]

                          07 Sep,                

 

                          [facility] [place] [facility]     [address] [id number] [place] [address]

                          03 Sep, 2013               

 

                          [facility] [place] [facility]     [address] [id number] [place] [address]

                          02 Sep,                

 

                          [facility] [place] [facility]     [address] [id number] [place] [address]

                          28 Aug, 2013               

 

                          [facility] [place] [facility]     [address] [id number] [place] [address]

                          27 Aug, 2013               

 

                          [facility] [place] [facility]     [address] [id number] [place] [address]

                          10 May, 2013               

 

                          [facility] [place] [facility]     [address] [id number] [place] [address]

                                         

 

                          [facility] [place] [facility]     [address] [id number] [place] [address]

                                         

 

                          [facility] [place] [facility]     [address] [id number] [place] [address]

                                         

 

                          [facility] [place] [facility]     [address] [id number] [place] [address]

                          29 Oct, 2012               

 

                          [facility] [place] [facility]     [address] [id number] [place] [address]

                          29 Oct, 2012               

 

                          [facility] [place] [facility]     [address] [id number] [place] [address]

                          22 Oct, 2012               

 

                          [facility] [place] [facility]     [address] [id number] [place] [address]

                          22 Oct, 2012               

 

                          [facility] [place] [facility]     [address] [id number] [place] [address]

                          15 Oct, 2012               

 

                          [facility] [place] [facility]     [address] [id number] [place] [address]

                          15 Oct, 2012               

 

                          [facility] [place] [facility]     [address] [id number] [place] [address]

                          24 Sep, 2012               

 

                          [facility] [place] [facility]     [address] [id number] [place] [address]

                          15 Sep, 2012               

 

                          [facility] [place] [facility]     [address] [id number] [place] [address]

                          12 Sep, 2012               

 

                          [facility] [place] [facility]     [address] [id number] [place] [address]

                          10 Sep, 2012               

 

                          [facility] [place] [facility]     [address] [id number] [address]

                          09 Sep, 2012               

 

                          [facility]     [id number] [address]

                          06 Sep, 2012               

 

                          [facility]     [id number] [address]

                          04 Aug, 2012               

 

                          [facility]     [id number] [address]

                                         

 

                          [facility]     [id number] [place] [address]

                                         

 

                          [facility]     [id number] [place] [address]

                                         

 

                          [facility]     [id number] [place] [address]

                                         

 

                          [facility]     [id number] [place] [address]

                                         

 

                          [facility]     [id number] [address]

                                         

 

                          [facility]     [id number] [place] [address]

                          29 Dec, 2010               

 

                          [facility]     [id number] [address]

                          04 Dec, 2010               

 

                          [facility]     [address] [id number] [address]

                                         

 

                          [facility]     [id number] [address]

                          16 Aug, 2010               







IMMUNIZATIONS

No Known Immunizations



SOCIAL HISTORY

Never Assessed



REASON FOR VISIT

Cough, congestion-[place], RMA, having really bad chest congestion, cant eat, co
ughing up yellow mucus,headaches.patient states it started about saturday night 




PLAN OF CARE







                          Activity                  Details









                                         









                          Follow Up                 prn Reason:







VITAL SIGNS







                    Height              67 in               2018

 

                    Weight              174.9 lbs           2018

 

                    Temperature         98.9 degrees Fahrenheit    2018

 

                    Heart Rate          107 bpm             2018

 

                    Respiratory Rate    20                  2018

 

                    Oximetry            on room air:98 %    2018

 

                    BMI                 27.39 kg/m2         2018

 

                    Blood pressure systolic    130 mmHg            2018

 

                    Blood pressure diastolic    88 mmHg             2018







MEDICATIONS







        Medication    Instructions    Dosage    Frequency    Start Date    End Date    Duration    Status



 

                ProAir  (90 Base) MCG/ACT    Inhalation every 6 hrs    2 puffs as needed    6h 

                11 Sep, 2018                    30 days         Active

 

             Promethazine-Codeine 6.25-10 MG/5ML    Orally every 6 hrs    5 ml as needed    6h           11 

Sep, 2018           16 Sep, 2018        5 days              Active

 

                    Ibuprofen 800 MG    Orally Three times a day    1 tablet with food or milk as needed

             8h                                         Active







RESULTS

No Results



PROCEDURES

No Known procedures



INSTRUCTIONS





MEDICATIONS ADMINISTERED

No Known Medications



MEDICAL (GENERAL) HISTORY







                    Type                Description         Date

 

                    Medical History     Esophageal reflux     

 

                    Medical History     Esophageal reflux     

 

                    Surgical History    No Surgical history information     

 

                    Hospitalization History    pneumonia-as child

## 2019-07-03 NOTE — XMS REPORT
Continuity of Care Document

                             Created on: 2019



TIBURCIO DAYANNA

External Reference #: [id number]

: 1990

Sex: Male



Demographics







                          Address                   [address] [place]  [address]

 

                          Home Phone                [phone] x

 

                          Preferred Language        Unknown

 

                          Marital Status            Unknown

 

                          Gnosticism Affiliation     Unknown

 

                          Race                      Unknown

 

                          Ethnic Group              Unknown





Author







                          Organization              Unknown

 

                          Address                   Unknown

 

                          Phone                     [phone]



              



Allergies

      





             Active              Description              Code              Type              Severity

                Reaction              Onset              Reported/Identified              Relationship

 to Patient                             Clinical Status        

 

                    Yes                 Wellbutrin  mg tablet extended release                       

             Drug Allergy                                                           10/22/2012    

                                                             

 

                    Yes                 Wellbutrin  mg tablet extended release                       

             Drug Allergy              N/A              N/A                             10/22/2012

                                                             



                    



Medications

      



There is no data.                  



Problems

      





             Date Dx Coded              Attending              Type              Code              Diagnosis

                                        Diagnosed By        

 

             2010                                            V74.5              STD SCREEN     

                                                 

 

             2010                                            V74.5              STD SCREEN     

                                                 

 

             2010                                            V74.5              STD SCREEN     

                                                 

 

             2010                                            V74.5              STD SCREEN     

                                                 

 

             2010                                            V74.5              STD SCREEN     

                                                 

 

             2010              KENDY SHEETS DO                             V74.5              

STD SCREEN                                       

 

             2010              KENDY SHEETS DO                             V74.5              

STD SCREEN                                       

 

             2010              SHEETS KENDY RAY                             V74.5              

STD SCREEN                                       

 

                2010              FAY RAMOS MD                              V74.5         

                          STD SCREEN                         

 

             2010              SHEETS KENDY RAY                             V74.5              

STD SCREEN                                       

 

                2010              JONELLE PRAKASH                              V74.5       

                          STD SCREEN                         

 

             2010                                            788.1              DYSURIA        

                                                 

 

             2010                                            788.41              URINARY FREQUENCY

                                                 

 

             2010                                            788.1              DYSURIA        

                                                 

 

             2010                                            788.41              URINARY FREQUENCY

                                                 

 

             2010                                            788.1              DYSURIA        

                                                 

 

             2010                                            788.41              URINARY FREQUENCY

                                                 

 

             2010                                            788.1              DYSURIA        

                                                 

 

             2010                                            788.41              URINARY FREQUENCY

                                                 

 

             2010                                            788.1              DYSURIA        

                                                 

 

             2010                                            788.41              URINARY FREQUENCY

                                                 

 

             2010              KENDY SHEETS DO                             788.1              

DYSURIA                                          

 

                2010              KENDY SHEETS DO                              788.41           

                          URINARY FREQUENCY                       

 

             2010              KENDY SHEETS DO                             788.1              

DYSURIA                                          

 

                2010              KENDY SHEETS DO                              788.41           

                          URINARY FREQUENCY                       

 

             2010              SHEETS DO, KENDY K                             788.1              

DYSURIA                                          

 

                2010              SHEETS DO, KENDY K                              788.41           

                          URINARY FREQUENCY                       

 

                2010              FAY RAMOS MD                              788.1         

                          DYSURIA                            

 

                2010              FAY RAMOS MD N                              788.41        

                          URINARY FREQUENCY                       

 

             2010              SHEETS DO, KENDY K                             788.1              

DYSURIA                                          

 

                2010              SHEETS DO, KENDY K                              788.41           

                          URINARY FREQUENCY                       

 

                2010              JONELLE PRAKASH R                              788.1       

                          DYSURIA                            

 

                2010              JONELLE PRAKASH R                              788.41      

                          URINARY FREQUENCY                       

 

             2010                                            597.80              URETHRITIS    

                                                 

 

             2010                                            597.80              URETHRITIS    

                                                 

 

             2010                                            597.80              URETHRITIS    

                                                 

 

             2010                                            597.80              URETHRITIS    

                                                 

 

             2010                                            597.80              URETHRITIS    

                                                 

 

                2010              SHEETS DO, KENDY K                              597.80           

                          URETHRITIS                         

 

                2010              SHEETS DO, KENDY K                              597.80           

                          URETHRITIS                         

 

                2010              SHEETS DO, KENDY K                              597.80           

                          URETHRITIS                         

 

                2010              FAY RAMOS MD                              597.80        

                          URETHRITIS                         

 

                2010              SHEETS DO, KENDY K                              597.80           

                          URETHRITIS                         

 

                2010              JONELLE PRAKASH R                              597.80      

                          URETHRITIS                         

 

             2010                                            462              ACUTE PHARYNGITIS

                                                 

 

             2010                                            462              Acute Pharyngitis

                                                 

 

             2010                                            462              Acute Pharyngitis

                                                 

 

             2010                                            462              Acute Pharyngitis

                                                 

 

             2010                                            462              Acute Pharyngitis

                                                 

 

             2010              SHEETS DO, KENDY K                             462              Acute

 Pharyngitis                                     

 

             2010              SHEETS DO, KENDY K                             462              Acute

 Pharyngitis                                     

 

             2010              SHEETS DO, KENDY K                             462              Acute

 Pharyngitis                                     

 

                2010              FAY RAMOS MD                              462           

                          Acute Pharyngitis                       

 

             2010              SHEETS DO, KENDY K                             462              Acute

 Pharyngitis                                     

 

                2010              JONELLE PRAKASH R                              462         

                          Acute Pharyngitis                       

 

             2011                                            477.9              ALLERGIC RHINITIS

                                                 

 

             2011                                            477.9              Allergic Rhinitis

                                                 

 

             2011                                            477.9              Allergic Rhinitis

                                                 

 

             2011                                            477.9              Allergic Rhinitis

                                                 

 

             2011                                            477.9              Allergic Rhinitis

                                                 

 

             2011              KORTNEY RAY KENDY K                             477.9              

Allergic Rhinitis                                

 

             2011              SHEETS DO KENDY K                             477.9              

Allergic Rhinitis                                

 

             2011              SHEETS DO KENDY K                             477.9              

Allergic Rhinitis                                

 

                2011              FAY RAMOS MD                              477.9         

                          Allergic Rhinitis                       

 

             2011              SHEETS DO KENDY K                             477.9              

Allergic Rhinitis                                

 

                2011              JONELLE PRAKASH                              477.9       

                          Allergic Rhinitis                       

 

             2011                                            381.01              ACUTE SEROUS OTITIS

 MEDIA                                           

 

             2011                                            381.01              Acute Serous Otitis

 Media                                           

 

             2011                                            381.01              Acute Serous Otitis

 Media                                           

 

             2011                                            381.01              Acute Serous Otitis

 Media                                           

 

             2011                                            381.01              Acute Serous Otitis

 Media                                           

 

                2011              АНДРЕЙ SHEETS DOA K                              381.01           

                          Acute Serous Otitis Media                       

 

                2011              АНДРЕЙ SHEETS DOA K                              381.01           

                          Acute Serous Otitis Media                       

 

                2011              АНДРЕЙ SHEETS DOA K                              381.01           

                          Acute Serous Otitis Media                       

 

                2011              FAY RAMOS MD                              381.01        

                          Acute Serous Otitis Media                       

 

                2011              SHEETS АНДРЕЙ RAYA K                              381.01           

                          Acute Serous Otitis Media                       

 

                2011              JONELLE PRAKASH                              381.01      

                          Acute Serous Otitis Media                       

 

             2012                                            034.0              STREP THROAT   

                                                 

 

             2012                                            034.0              Strep Throat   

                                                 

 

             2012                                            034.0              Strep Throat   

                                                 

 

             2012                                            034.0              Strep Throat   

                                                 

 

             2012                                            034.0              Strep Throat   

                                                 

 

             2012              АНДРЕЙ SHEETS DOA K                             034.0              

Strep Throat                                     

 

             2012              SHEETS DO KENDY K                             034.0              

Strep Throat                                     

 

             2012              SHEETS DO KENDY K                             034.0              

Strep Throat                                     

 

                2012              FAY RAMOS MD                              034.0         

                          Strep Throat                       

 

             2012              АНДРЕЙ SHEETS DOA K                             034.0              

Strep Throat                                     

 

                2012              JONELLE PRAKASH                              034.0       

                          Strep Throat                       

 

             2012                                            788.7              URETHRAL DISCHARGE

                                                 

 

             2012                                            V69.2              HIGH-RISK SEXUAL

 BEHAVIOR                                        

 

             2012                                            788.7              URETHRAL DISCHARGE

                                                 

 

             2012                                            V69.2              HIGH-RISK SEXUAL

 BEHAVIOR                                        

 

             2012                                            788.7              URETHRAL DISCHARGE

                                                 

 

             2012                                            V69.2              HIGH-RISK SEXUAL

 BEHAVIOR                                        

 

             2012                                            788.7              URETHRAL DISCHARGE

                                                 

 

             2012                                            V69.2              HIGH-RISK SEXUAL

 BEHAVIOR                                        

 

             2012                                            788.7              URETHRAL DISCHARGE

                                                 

 

             2012                                            V69.2              HIGH-RISK SEXUAL

 BEHAVIOR                                        

 

             2012              SHEETS DO, KENDY K                             788.7              

URETHRAL DISCHARGE                               

 

             2012              SHEETS DO, KENDY K                             V69.2              

HIGH-RISK SEXUAL BEHAVIOR                        

 

             2012              SHEETS DO, KENDY K                             788.7              

URETHRAL DISCHARGE                               

 

             2012              SHEETS DO, KENDY K                             V69.2              

HIGH-RISK SEXUAL BEHAVIOR                        

 

             2012              SHEETS DO, KENDY K                             788.7              

URETHRAL DISCHARGE                               

 

             2012              SHEETS DO, KENDY K                             V69.2              

HIGH-RISK SEXUAL BEHAVIOR                        

 

                2012              FAY RAMOS MD N                              788.7         

                          URETHRAL DISCHARGE                       

 

                2012              FAY RAMOS MD N                              V69.2         

                          HIGH-RISK SEXUAL BEHAVIOR                       

 

             2012              SHEETS DO KENDY K                             788.7              

URETHRAL DISCHARGE                               

 

             2012              SHEETS DO, KENDY K                             V69.2              

HIGH-RISK SEXUAL BEHAVIOR                        

 

                2012              CARLOS ENRIQUE PRAKASHINA R                              788.7       

                          URETHRAL DISCHARGE                       

 

                2012              BRANDON LEE JONELLE R                              V69.2       

                          HIGH-RISK SEXUAL BEHAVIOR                       

 

             10/15/2012                                            311              DEPRESSIVE DISORDER

 NOT ELSEWHERE CLASSIFIED                        

 

             10/15/2012                                            V58.69              LONG-TERM (CURRENT)

 USE OF OTHER MEDICATIONS                        

 

             10/15/2012                                            311              DEPRESSIVE DISORDER

 NOT ELSEWHERE CLASSIFIED                        

 

             10/15/2012                                            V58.69              LONG-TERM (CURRENT)

 USE OF OTHER MEDICATIONS                        

 

             10/15/2012                                            311              DEPRESSIVE DISORDER

 NOT ELSEWHERE CLASSIFIED                        

 

             10/15/2012                                            V58.69              LONG-TERM (CURRENT)

 USE OF OTHER MEDICATIONS                        

 

             10/15/2012                                            311              DEPRESSIVE DISORDER

 NOT ELSEWHERE CLASSIFIED                        

 

             10/15/2012                                            V58.69              LONG-TERM (CURRENT)

 USE OF OTHER MEDICATIONS                        

 

             10/15/2012                                            311              DEPRESSIVE DISORDER

 NOT ELSEWHERE CLASSIFIED                        

 

             10/15/2012                                            V58.69              LONG-TERM (CURRENT)

 USE OF OTHER MEDICATIONS                        

 

             10/15/2012              KENDY SHEETS DO K                             311              DEPRESSIVE

 DISORDER NOT ELSEWHERE CLASSIFIED                       

 

                10/15/2012              KENDY SHEETS DO                              V58.69           

                          LONG-TERM (CURRENT) USE OF OTHER MEDICATIONS                       

 

             10/15/2012              SHEETS DO, KENDY K                             311              DEPRESSIVE

 DISORDER NOT ELSEWHERE CLASSIFIED                       

 

                10/15/2012              SHEETS DO KENDY K                              V58.69           

                          LONG-TERM (CURRENT) USE OF OTHER MEDICATIONS                       

 

             10/15/2012              SHEETS DO KENDY K                             311              DEPRESSIVE

 DISORDER NOT ELSEWHERE CLASSIFIED                       

 

                10/15/2012              SHEETS DO KENDY K                              V58.69           

                          LONG-TERM (CURRENT) USE OF OTHER MEDICATIONS                       

 

                10/15/2012              FAY RAMOS MD                              311           

                          DEPRESSIVE DISORDER NOT ELSEWHERE CLASSIFIED                       

 

                10/15/2012              FAY RAMOS MD                              V58.69        

                          LONG-TERM (CURRENT) USE OF OTHER MEDICATIONS                       

 

             10/15/2012              SHEETS DO KENDY K                             311              DEPRESSIVE

 DISORDER NOT ELSEWHERE CLASSIFIED                       

 

                10/15/2012              SHEETS DO KENDY K                              V58.69           

                          LONG-TERM (CURRENT) USE OF OTHER MEDICATIONS                       

 

                10/15/2012              JONELLE PRAKASH                              311         

                          DEPRESSIVE DISORDER NOT ELSEWHERE CLASSIFIED                       

 

                10/15/2012              JONELLE PRAKASH                              V58.69      

                          LONG-TERM (CURRENT) USE OF OTHER MEDICATIONS                       

 

             2013                                            530.81              GERD          

                                                 

 

             2013                                            530.81              GERD          

                                                 

 

             2013                                            530.81              GERD          

                                                 

 

                2013              KORTNEY RAY KENDY K                              530.81           

                          GERD                               

 

                2013              SHEETS , KENDY K                              530.81           

                          GERD                               

 

                2013              SHEETS DO KENDY K                              530.81           

                          GERD                               

 

                2013              FAY RAMOS MD                              530.81        

                          GERD                               

 

                2013              SHEETS , KENDY K                              530.81           

                          GERD                               

 

                2013              JONELLE PRAKASH R                              530.81      

                          GERD                               

 

             2014              АНДРЕЙ SHEETS DOA K                             462              ACUTE

 PHARYNGITIS                                     

 

                2014              FAY RAMOS MD                              462           

                          ACUTE PHARYNGITIS                       

 

             2014              АНДРЕЙ SHEETS DOA K                             462              ACUTE

 PHARYNGITIS                                     

 

                2014              BRANDON LEE JONELLE R                              462         

                          ACUTE PHARYNGITIS                       

 

                10/22/2014              FAY RAMOS MD                              381.00        

                          ACUTE NONSUPPURATIVE OTITIS MEDIA UNSPECIFIED                       

 

                10/22/2014              KENDY SHEETS DO K                              381.00           

                          ACUTE NONSUPPURATIVE OTITIS MEDIA UNSPECIFIED                       

 

                10/22/2014              JONELLE PRAKASH R                              381.00      

                          ACUTE NONSUPPURATIVE OTITIS MEDIA UNSPECIFIED                       

 

             2014              KENDY SHEETS DO                             558.9              

OTHER AND UNSPECIFIED NONINFECTIOUS GASTROENTERITIS AND COLITIS                    

   

 

                2014              JONELLE PRAKASH                              558.9       

                          OTHER AND UNSPECIFIED NONINFECTIOUS GASTROENTERITIS AND COLITIS               

        

 

                03/10/2015              JONELLE PRAKASH R                              372.00      

                          ACUTE CONJUNCTIVITIS UNSPECIFIED                       

 

                03/10/2015              JONELLE PRAKASH                              473.9       

                          UNSPECIFIED SINUSITIS (CHRONIC)                       

 

                03/10/2015              JONELLE PRAKASH                              478.19      

                          OTHER DISEASES OF NASAL CAVITY AND SINUSES                       

 

                2015              JONELLE PRAKASH                              724.2       

                          LUMBAGO/ LOW BACK PAIN                       



                                                                                
                                                                                
                                                                                
                                                                                




Procedures

      





                Code              Description              Performed By              Performed On     

   

 

                                      33765                                    UA W/ CULTURE IF INDICATED   

                                                    2013        

 

                                      89814                                    GC/CHLAM URINE (STATE)       

                                                    2013        

 

                                      53099                                    UA W/ CULTURE IF INDICATED   

                                                    2013        

 

                                      UROLO                                    TAWIL, ELIAS                 

                                                    2013        

 

                                      [id number]                                    STREP A  (IN-HOUSE)          

                                                    05/10/2013        

 

                                      74785                                    CULTURE UROGENITAL           

                                                    2013        

 

                                36911                                    GC CURE                        

                                        2013        

 

                                      47417                                    CULTURE CHLAMYDIA (OR CURE)  

                                                    2013        

 

                                      99243                                    CYTO/MOLECULAR REPORT-OUTSIDE

 LAB                                                  2013        

 

                                      38232                                    THERAPUTIC INJ SQ/IM         

                                                    2014        

 

                                                                          ROCEPHIN  mg          

                                                    2014        

 

                                      56916                                    XRAY CERVICAL SPINE, 2 OR 3 VIEWS

                                                    2015        



                                        



Results

      



There is no data.              



Encounters

      





                ACCT No.              Visit Date/Time              Discharge              Status      

                Pt. Type              Provider              Facility              Loc./Unit      

                                        Complaint        

 

                [id number]              2015 16:31:00              2015 23:59:59              

[facility]                 Outpatient              JONELLE PRAKASH                         

                                                             

 

                [id number]              2014 09:51:00              2014 23:59:59              

[facility]              Outpatient              KENDY SHEETS DO                                 

                                                 

 

                [id number]              10/22/2014 09:39:00              10/22/2014 23:59:59              

[facility]              Outpatient              FAY RAMOS MD                              

                                                 

 

                [id number]              2014 18:01:00              2014 23:59:59              

[facility]              Outpatient              KENDY SHEETS DO                                 

                                                 

 

                [id number]              2014 16:40:00              2014 23:59:59              

[facility]              Outpatient              KENDY SHEETS DO                                 

                                                 

 

                [id number]              2013 10:40:00              2013 23:59:59              

[facility]              Outpatient              KENDY SHEETS DO                                 

                                                 

 

                [id number]              2013 09:48:00              2013 23:59:59              

[facility]              Outpatient                                                                

    

 

                [id number]              2013 12:25:00              2013 23:59:59              

[facility]              Outpatient                                                                

    

 

                [id number]              2013 17:40:00              2013 23:59:59              

[facility]              Outpatient                                                                

    

 

             [id number]              2013 18:12:00                                            Document

 Registration                                                                       

 

             [id number]              05/10/2013 11:27:00                                            Document

 Registration                                                                       

 

                [id number]              05/10/2019 09:40:00              05/10/2019 23:59:59              CLS

                Outpatient              LUNA IBARRA                              

[facility]
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
## 2019-07-03 NOTE — XMS REPORT
[facility]

                             Created on: 2018



Paul Hillman

External Reference #: [id number]

: 1990

Sex: Male



Demographics







                          Address                   [address] [place]  [address]

 

                          Preferred Language        Unknown

 

                          Marital Status            Unknown

 

                          Yarsanism Affiliation     Unknown

 

                          Race                      Unknown

 

                          Ethnic Group              Unknown





Author







                          Author                    LUNA KARIMI

 

                          Organization              [facility]

 

                          Address                   [address] [place]  [address]



 

                          Phone                     [phone]







Care Team Providers







                    Care Team Member Name    Role                Phone

 

                    LUNA KARIMI    Unavailable         [phone]







PROBLEMS







          Type      Condition    ICD9-CM Code    MTV55-MQ Code    Onset Dates    Condition Status    SNOMED

 Code

 

           Problem    Gastroesophageal reflux disease without esophagitis               K21.9                 Active

                                        [id number]

 

          Problem    Acute mucoid otitis media of right ear              H65.111              Active    57212402



 

          Problem    Infected lesion in nose              J34.89              Active    249857055







ALLERGIES







             Substance    Reaction     Event Type    Date         Status

 

                Wellbutrin Sr 150 Mg Tablet Extended Release    "panic attack"    Non Drug Allergy    

                            Active







ENCOUNTERS







                Encounter       Location        Date            Diagnosis

 

                          [facility]    [id number] [place] [address]

                                        Gastroenteritis K52.9

 

                          [facility]     [address] [address]

                          19 Dec, 2017              Cough R05

 

                          [place] [id number] [place] [address]

                                        Acute nasopharyngitis J00

 

                          [facility]     [id number] [place] [address]

                          28 Aug, 2017              Left upper quadrant pain R10.12

 

                          [facility]     [id number] [place] [address]

                          02 Mar, 2017              Gastroenteritis K52.9 and Gastroesophageal reflux disease without

 esophagitis K21.9

 

                          [facility]     [id number] [place] [address]

                                        Fever, unspecified fever cause R50.9 ; Sore throat J02.9 and Influenza

 J11.1

 

                          [place] [address] [id number] [address]

                          11 Aug, 2016              Acute mucoid otitis media of right ear H65.111 ; Infected lesion 

in nose J34.89 and Gastroesophageal reflux disease without esophagitis K21.9

 

                          [facility]    [id number] [address]

                          16 May, 2016              Acute non-recurrent frontal sinusitis J01.10

 

                          [facility]     [id number] [place] [address]

                          27 Aug, 2015              Allergic rhinitis 477.9 ; Sinusitis 473.9 and Heartburn 787.1

 

                          [facility]     [id number] [place] [address]

                                        Gastroenteritis 558.9

 

                          [facility]     [id number] [place] [address]

                                         

 

                          [facility]     [id number] [place] [address]

                                         

 

                          [facility]     [address] [address]

                          10 Mar, 2015               

 

                          [facility]     [id number] [address]

                          10 Mar, 2015               

 

                          [facility]     [id number] [address]

                          09 Dec, 2014               

 

                          [facility]     [id number] [address]

                          09 Dec, 2014               

 

                          [facility]     [address] [address]

                          22 Oct, 2014               

 

                          [facility]     [id number] [place] [address]

                          22 Oct, 2014               

 

                          [facility]     [id number] [address]

                                         

 

                          [facility]     [id number] [place] [address]

                                         

 

                          [facility]     [address] [id number] [place] [address]

                                         

 

                          [facility] [place] [facility]     [address] [id number] [place] [address]

                          20 2014               

 

                          [facility] [place] [facility]     [address] [id number] [place] [address]

                          16 2014               

 

                          [facility] [place] [facility]     [address] [id number] [place] [address]

                          16 2014               

 

                          [facility] [place] [facility]     [address] [id number] [place] [address]

                          15 Elver, 2014               

 

                          [facility] [place] [facility]     [address] [id number] [place] [address]

                          15 2014               

 

                          [facility] [place] [facility]     [address] [id number] [place] [address]

                                         

 

                          [facility] [place] [facility]     [address] [id number] [place] [address]

                          17 Dec, 2013               

 

                          [facility] [place] [facility]     [address] [id number] [place] [address]

                          17 Dec, 2013               

 

                          [facility] [place] [facility]     [address] [id number] [place] [address]

                          17 Sep, 2013               

 

                          [facility] [place] [facility]     [address] [id number] [place] [address]

                          09 Sep, 2013               

 

                          [facility] [place] [facility]     [address] [id number] [place] [address]

                          07 Sep, 2013               

 

                          [facility] [place] [facility]     [address] [id number] [place] [address]

                          03 Sep, 2013               

 

                          [facility] [place] [facility]     [address] [id number] [place] [address]

                          02 Sep, 2013               

 

                          [facility] [place] [facility]     [address] [id number] [place] [address]

                          28 Aug, 2013               

 

                          [facility] [place] [facility]     [address] [id number] [place] [address]

                          27 Aug, 2013               

 

                          [facility] [place] [facility]     [address] [id number] [place] [address]

                          10 May, 2013               

 

                          [facility] [place] [facility]     [address] [id number] [place] [address]

                                         

 

                          [facility] [place] [facility]     [address] [id number] [place] [address]

                                         

 

                          [facility] [place] [facility]     [address] [id number] [place] [address]

                                         

 

                          [facility] [place] [facility]     [address] [id number] [place] [address]

                          29 Oct, 2012               

 

                          [facility] [place] [facility]     [address] [id number] [place] [address]

                          29 Oct, 2012               

 

                          [facility] [place] [facility]     [address] [id number] [place] [address]

                          22 Oct, 2012               

 

                          [facility] [place] [facility]     [address] [id number] [place] [address]

                          22 Oct, 2012               

 

                          [facility] [place] [facility]     [address] [id number] [place] [address]

                          15 Oct, 2012               

 

                          [facility] [place] [facility]     [address] [id number] [place] [address]

                          15 Oct, 2012               

 

                          [facility] [place] [facility]     [address] [id number] [place] [address]

                          24 Sep, 2012               

 

                          [facility] [place] [facility]     [address] [id number] [place] [address]

                          15 Sep, 2012               

 

                          [facility] [place] [facility]     [address] [id number] [place] [address]

                          12 Sep, 2012               

 

                          [facility] [place] [facility]     [address] [id number] [place] [address]

                          10 Sep, 2012               

 

                          [facility] [place] [facility]     [address] [id number] [place] [address]

                          09 Sep, 2012               

 

                          [facility]     [address] [address]

                          06 Sep, 2012               

 

                          [facility]     [address] [address]

                          04 Aug, 2012               

 

                          [facility]     [address] [address]

                                         

 

                          [facility]     [address] [address]

                                         

 

                          [facility]     [address] [address]

                                         

 

                          [facility]     [id number] [address]

                                         

 

                          [facility]     [address] [address]

                                         

 

                          [facility]     [address] [address]

                                         

 

                          [facility]     [address] [address]

                          29 Dec, 2010               

 

                          [facility]     [address] [address]

                          04 Dec, 2010               

 

                          [facility]     [id number] [address]

                                         

 

                          [facility]     [id number] [address]

                          16 Aug, 2010               







IMMUNIZATIONS

No Known Immunizations



SOCIAL HISTORY

Never Assessed



REASON FOR VISIT

Sinus Infection/headache  jjournotRN, Fever not higher than 99.9 since 17



PLAN OF CARE







                          Activity                  Details









                                         









                          Follow Up                 prn Reason:







VITAL SIGNS







                    Height              67 in               2017

 

                    Weight              182.4 lbs           2017

 

                    Temperature         99.7 degrees Fahrenheit    2017

 

                    Heart Rate          84 bpm              2017

 

                    Respiratory Rate    24                  2017

 

                    BMI                 28.56 kg/m2         2017

 

                    Blood pressure systolic    134 mmHg            2017

 

                    Blood pressure diastolic    86 mmHg             2017







MEDICATIONS







        Medication    Instructions    Dosage    Frequency    Start Date    End Date    Duration    Status



 

                    Flonase 50 mcg/actuation                        1 sprays by Nasal route 2 times per day in each nostril

                          10 Mar, 2015                 90 days      Not-Taking

 

        DayQuil Multi-Symptom                                                    Active

 

                    Azithromycin 250 MG    Orally Once a day    2 tablets  on the first day, then 1 tablet

 daily for 4 days    24h              5 day(s)     Active

 

             Zofran ODT 8 MG    Orally every 4-6 hours as needed    as directed                 02 Mar, 2017 

                                        2 days              Not-Taking

 

                    Zofran ODT 8 MG     Orally 3 times a day prn    1 tablet on the tongue and allow to dissolve

                                           2 days       Not-Taking

 

                Fluticasone Propionate 50 MCG/ACT    Nasally Once a day    1 spray in each nostril    

24h                                 30 day(s)       Active







RESULTS

No Results



PROCEDURES

No Known procedures



INSTRUCTIONS





MEDICATIONS ADMINISTERED

No Known Medications



MEDICAL (GENERAL) HISTORY







                    Type                Description         Date

 

                    Medical History     Esophageal reflux     

 

                    Medical History     Esophageal reflux     

 

                    Hospitalization History    pneumonia-as child

## 2019-07-03 NOTE — XMS REPORT
[facility]

                             Created on: 2019



Paul Hillman

External Reference #: [id number]

: 1990

Sex: Male



Demographics







                          Address                   [address] [place]  [address]

 

                          Preferred Language        Unknown

 

                          Marital Status            Unknown

 

                          Congregation Affiliation     Unknown

 

                          Race                      Unknown

 

                          Ethnic Group              Unknown





Author







                          Author                    Migration,  Doctor

 

                          Organization              [facility]

 

                          Address                   Unknown

 

                          Phone                     Unavailable







Care Team Providers







                    Care Team Member Name    Role                Phone

 

                    Migration,  Doctor    Unavailable         Unavailable







PROBLEMS







          Type      Condition    ICD9-CM Code    [id number] Code    Onset Dates    Condition Status    SNOMED

 Code

 

          Problem    Sinusitis              J32.9               Active    [id number]







ALLERGIES

No Information



ENCOUNTERS







                Encounter       Location        Date            Diagnosis

 

                          [facility]    [address] [address]

                                        Sinusitis J32.9

 

                          [facility] [place] [address] [address]

                                        Acute sinusitis J01.90 ; Sore throat J02.9 and Flu-like symptoms 

R68.89

 

                          [place] [address] [address]

                          11 Sep, 2018              Acute bronchitis, unspecified organism J20.9

 

                          [place] [address] [address]

                                        Spasm of thoracic back muscle M62.830

 

                          [facility] [place] [facility]    [address] [address]

                                        Gastroenteritis K52.9

 

                          [place] [address] [address]

                          19 Dec, 2017              Cough R05

 

                          [place] [address] [address]

                                        Acute nasopharyngitis J00

 

                          [place] [address] [address]

                          28 Aug, 2017              Left upper quadrant pain R10.12

 

                          [place] [address] [address]

                          02 Mar, 2017              Gastroenteritis K52.9 and Gastroesophageal reflux disease without

 esophagitis K21.9

 

                          [facility]     [address] [place] [id number] [place] [address]

                                        Fever, unspecified fever cause R50.9 ; Sore throat J02.9 and Influenza

 J11.1

 

                          [facility]     [address] [place] [id number] [place] [address]

                          11 Aug, 2016              Acute mucoid otitis media of right ear H65.111 ; Infected lesion 

in nose J34.89 and Gastroesophageal reflux disease without esophagitis K21.9

 

                          [facility] [place] [facility]    [address] [place] [id number] [place] [address]

                          16 May, 2016              Acute non-recurrent frontal sinusitis J01.10

 

                          [facility]     [address] [place] [id number] [place] [address]

                          27 Aug, 2015              Allergic rhinitis 477.9 ; Sinusitis 473.9 and Heartburn 787.1

 

                          [facility]     [id number] [place] [address]

                                        Gastroenteritis 558.9

 

                          [facility]     [address] [address]

                          14 2015               

 

                          [facility]     [address] [id number] [place] [address]

                                         

 

                          [facility]     [address] [place] [id number] [place] [address]

                          10 Mar, 2015               

 

                          [facility]     [id number] [place] [address]

                          10 Mar, 2015               

 

                          [facility]     [address] [place] [id number] [place] [address]

                          09 Dec, 2014               

 

                          [facility]     [address] [place] [id number] [place] [address]

                          09 Dec, 2014               

 

                          [facility]     [address] [place] [id number] [place] [address]

                          22 Oct, 2014               

 

                          [facility]     [address] [id number] [place] [address]

                          22 Oct, 2014               

 

                          [facility] [place] [facility]     [address] [id number] [place] [address]

                                         

 

                          [facility] [place] [facility]     [address] [id number] [place] [address]

                          14 2014               

 

                          [facility] [place] [facility]     [address] [id number] [place] [address]

                                         

 

                          [facility] [place] [facility]     [address] [id number] [place] [address]

                                         

 

                          [facility] [place] [facility]     [address] [id number] [place] [address]

                          16 2014               

 

                          [facility] [place] [facility]     [address] [id number] [place] [address]

                          16 2014               

 

                          [facility] [place] [facility]     [address] [id number] [place] [address]

                          15 2014               

 

                          [facility] [place] [facility]     [address] [id number] [place] [address]

                          15 2014               

 

                          [facility] [place] [facility]     [address] [id number] [place] [address]

                          13 2014               

 

                          [facility] [place] [facility]     [address] [id number] [place] [address]

                          17 Dec, 2013               

 

                          [facility] [place] [facility]     [address] [id number] [place] [address]

                          17 Dec, 2013               

 

                          [facility] [place] [facility]     [address] [id number] [place] [address]

                          17 Sep, 2013               

 

                          [facility] [place] [facility]     [address] [id number] [place] [address]

                          09 Sep, 2013               

 

                          [facility] [place] [facility]     [address] [id number] [place] [address]

                          07 Sep, 2013               

 

                          [facility] [place] [facility]     [address] [id number] [place] [address]

                          03 Sep, 2013               

 

                          [facility] [place] [facility]     [address] [id number] [place] [address]

                          02 Sep, 2013               

 

                          [facility] [place] [facility]     [address] [id number] [place] [address]

                          28 Aug, 2013               

 

                          [facility] [place] [facility]     [address] [id number] [place] [address]

                          27 Aug, 2013               

 

                          [facility] [place] [facility]     [address] [id number] [place] [address]

                          10 May, 2013               

 

                          [facility] [place] [facility]     [address] [id number] [place] [address]

                                         

 

                          [facility] [place] [facility]     [address] [id number] [place] [address]

                                         

 

                          [facility] [place] [facility]     [address] [id number] [place] [address]

                                         

 

                          [facility] [place] [facility]     [address] [id number] [place] [address]

                          29 Oct, 2012               

 

                          [facility] [place] [facility]     [address] [id number] [place] [address]

                          29 Oct, 2012               

 

                          [facility] [place] [facility]     [address] [id number] [place] [address]

                          22 Oct, 2012               

 

                          [facility] [place] [facility]     [address] [id number] [place] [address]

                          22 Oct, 2012               

 

                          [facility] [place] [facility]     [address] [id number] [address]

                          15 Oct, 2012               

 

                          [facility] [place] [facility]     [address] [id number] [place] [address]

                          15 Oct, 2012               

 

                          [facility] [place] [facility]     [address] [id number] [place] [address]

                          24 Sep, 2012               

 

                          [facility] [place] [facility]     [address] [id number] [place] [address]

                          15 Sep, 2012               

 

                          [facility] [place] [facility]     [address] [address]

                          12 Sep, 2012               

 

                          [facility]     [address] [facility] [id number] [address]

                          10 Sep, 2012               

 

                          [facility]     [address] [facility] [id number] [address]

                          09 Sep, 2012               

 

                          [facility]     [address] [address]

                          06 Sep, 2012               

 

                          [facility]     [address] [facility] [id number] [address]

                          04 Aug, 2012               

 

                          [facility]     [address] [place] [address]

                                         

 

                          [facility]     [id number] [address]

                                         

 

                          [facility]     [address] [place] [address]

                                         

 

                          [facility]     [id number] [address]

                                         

 

                          [facility]     [id number] [address]

                                         

 

                          [facility]     [address] [address]

                                         

 

                          [facility]     [address]65[place] [address]

                          29 Dec, 2010               

 

                          [facility]     [address]65[place] [address]

                          04 Dec, 2010               

 

                          [facility]     [address]65[place] [address]

                                         

 

                          [facility]     [address] [address]

                          16 Aug, 2010               







IMMUNIZATIONS

No Known Immunizations



SOCIAL HISTORY

Never Assessed



REASON FOR VISIT

[facility]



PLAN OF CARE





VITAL SIGNS





MEDICATIONS

Unknown Medications



RESULTS

No Results



PROCEDURES

No Known procedures



INSTRUCTIONS





MEDICATIONS ADMINISTERED

No Known Medications



MEDICAL (GENERAL) HISTORY







                    Type                Description         Date

 

                    Medical History     Esophageal reflux     

 

                    Medical History     Esophageal reflux     

 

                    Surgical History    No Surgical history information     

 

                    Hospitalization History    pneumonia-as child

## 2019-07-03 NOTE — XMS REPORT
[facility]

                             Created on: 2019



Paul Hillman

External Reference #: [id number]

: 1990

Sex: Male



Demographics







                          Address                   [address] [place] [address]

 

                          Preferred Language        Unknown

 

                          Marital Status            Unknown

 

                          Zoroastrian Affiliation     Unknown

 

                          Race                      Unknown

 

                          Ethnic Group              Unknown





Author







                          Author                    Migration,  Doctor

 

                          Organization              [facility]

 

                          Address                   Unknown

 

                          Phone                     Unavailable







Care Team Providers







                    Care Team Member Name    Role                Phone

 

                    Migration,  Doctor    Unavailable         Unavailable







PROBLEMS







          Type      Condition    ICD9-CM Code    VEF38-YA Code    Onset Dates    Condition Status    SNOMED

 Code

 

          Problem    Sinusitis              J32.9               Active    [id number]







ALLERGIES

No Information



ENCOUNTERS







                Encounter       Location        Date            Diagnosis

 

                          [place] [address] [address]

                          10 May, 2019              Irritant contact dermatitis due to plants, except food L24.7

 

                          [place] [address] [address]

                                        Allergic contact dermatitis due to plants, except food L23.7

 

                          [facility]    [address] [address]

                                        Sinusitis J32.9

 

                          [facility] [place] [address] [address]

                                        Acute sinusitis J01.90 ; Sore throat J02.9 and Flu-like symptoms 

R68.89

 

                          [place] [address] [address]

                          11 Sep, 2018              Acute bronchitis, unspecified organism J20.9

 

                          [place] [address] [address]

                                        Spasm of thoracic back muscle M62.830

 

                          [facility]    [address] [address]

                                        Gastroenteritis K52.9

 

                          [place] [address] [address]

                          19 Dec, 2017              Cough R05

 

                          [place] [address] [address]

                                        Acute nasopharyngitis J00

 

                          [facility]     [address] [place] [id number] [place] [address]

                          28 Aug, 2017              Left upper quadrant pain R10.12

 

                          [facility]     [address] [place] [id number] [place] [address]

                          02 Mar, 2017              Gastroenteritis K52.9 and Gastroesophageal reflux disease without

 esophagitis K21.9

 

                          [place] [address] [address]

                                        Fever, unspecified fever cause R50.9 ; Sore throat J02.9 and Influenza

 J11.1

 

                          [place] [address] [place] [id number] [place] [address]

                          11 Aug, 2016              Acute mucoid otitis media of right ear H65.111 ; Infected lesion 

in nose J34.89 and Gastroesophageal reflux disease without esophagitis K21.9

 

                          [facility]    [id number] [place] [address]

                          16 May, 2016              Acute non-recurrent frontal sinusitis J01.10

 

                          [place] [address] [place] [id number] [place] [address]

                          27 Aug, 2015              Allergic rhinitis 477.9 ; Sinusitis 473.9 and Heartburn 787.1

 

                          [place] [address] [place] [id number] [place] [address]

                                        Gastroenteritis 558.9

 

                          [place] [address] [place] [id number] [place] [address]

                          14 2015               

 

                          [facility]     [id number] [place] [address]

                          13 2015               

 

                          [place] [address] [address]

                          10 Mar, 2015               

 

                          [facility]     [address] [place] [address] [id number] [place] [address]

                          10 Mar, 2015               

 

                          [place] [address] [place] [address]

                          09 Dec, 2014               

 

                          [facility] [place] [facility]     [address] [id number] [place] [address]

                          09 Dec, 2014               

 

                          [facility] [place] [facility]     [address] [id number] [place] [address]

                          22 Oct, 2014               

 

                          [facility] [place] [facility]     [address] [id number] [place] [address]

                          22 Oct, 2014               

 

                          [facility] [place] [facility]     [address] [id number] [place] [address]

                                         

 

                          [facility] [place] [facility]     [address] [id number] [place] [address]

                                         

 

                          [facility] [place] [facility]     [address] [id number] [place] [address]

                                         

 

                          [facility] [place] [facility]     [address] [id number] [place] [address]

                                         

 

                          [facility] [place] [facility]     [address] [id number] [place] [address]

                          16 2014               

 

                          [facility] [place] [facility]     [address] [id number] [place] [address]

                          16 2014               

 

                          [facility] [place] [facility]     [address] [id number] [place] [address]

                          15 2014               

 

                          [facility] [place] [facility]     [address] [id number] [place] [address]

                          15 2014               

 

                          [facility] [place] [facility]     [address] [id number] [place] [address]

                          13 2014               

 

                          [facility] [place] [facility]     [address] [id number] [place] [address]

                          17 Dec, 2013               

 

                          [facility] [place] [facility]     [address] [id number] [place] [address]

                          17 Dec, 2013               

 

                          [facility] [place] [facility]     [address] [id number] [place] [address]

                          17 Sep, 2013               

 

                          [facility] [place] [facility]     [address] [id number] [place] [address]

                          09 Sep,                

 

                          [facility]     [address] [id number] [place] [address]

                          07 Sep,                

 

                          [facility]     [address] [id number] [place] [address]

                          03 Sep, 2013               

 

                          [facility]     [address] [id number] [place] [address]

                          02 Sep, 2013               

 

                          [facility]     [address] [id number] [place] [address]

                          28 Aug, 2013               

 

                          [facility]     [address] [id number] [place] [address]

                          27 Aug, 2013               

 

                          [facility]     [address] [id number] [place] [address]

                          10 May, 2013               

 

                          [facility]     [address] [id number] [place] [address]

                                         

 

                          [facility]     [address] [id number] [place] [address]

                                         

 

                          [facility]     [address] [id number] [place] [address]

                                         

 

                          [facility]     [address] [id number] [place] [address]

                          29 Oct, 2012               

 

                          [facility]     [address] [id number] [place] [address]

                          29 Oct, 2012               

 

                          [facility]     [address] [id number] [place] [address]

                          22 Oct, 2012               

 

                          [facility] [place] [facility]     [address] [id number] [place] [address]

                          22 Oct, 2012               

 

                          [facility]     [address] [id number] [place] [address]

                          15 Oct, 2012               

 

                          [facility]     [address] [id number] [place] [address]

                          15 Oct, 2012               

 

                          [facility] [place] [facility]     [address] [id number] [place] [address]

                          24 Sep, 2012               

 

                          [facility] [place] [facility]     [address] [id number] [place] [address]

                          15 Sep, 2012               

 

                          [facility] [place] [facility]     [address] [id number] [place] [address]

                          12 Sep, 2012               

 

                          [facility] [place] [facility]     [address] [id number] [place] [address]

                          10 Sep, 2012               

 

                          [facility] [place] [facility]     [address] [id number] [place] [address]

                          09 Sep, 2012               

 

                          [facility] [place] [facility]     [address] [id number] [place] [address]

                          06 Sep, 2012               

 

                          [facility] [place] [facility]     [address] [id number] [place] [address]

                          04 Aug, 2012               

 

                          [facility] [place] [facility]     [address] [id number] [place] [address]

                                         

 

                          [facility] [place] [facility]     [address] [id number] [place] [address]

                                         

 

                          [facility] [place] [facility]     [address] [id number] [place] [address]

                                         

 

                          [facility] [place] [facility]     [address] [id number] [place] [address]

                                         

 

                          [facility] [place] [facility]     [address] [id number] [place] [address]

                                         

 

                          [facility] [place] [facility]     [address] [id number] [place] [address]

                                         

 

                          [facility] [place] [facility]     [address] [id number] [place] [address]

                          29 Dec, 2010               

 

                          [facility] [place] [facility]     [address] [id number] [place] [address]

                          04 Dec, 2010               

 

                          [facility] [place] [facility]     [address] [id number] [place] [address]

                                         

 

                          [facility]     [address] [facility] [id number] [place] [address]

                          16 Aug, 2010               







IMMUNIZATIONS

No Known Immunizations



SOCIAL HISTORY

Never Assessed



REASON FOR VISIT

[facility]



PLAN OF CARE





VITAL SIGNS





MEDICATIONS

No Known Medications



RESULTS

No Results



PROCEDURES

No Known procedures



INSTRUCTIONS





MEDICATIONS ADMINISTERED

No Known Medications



MEDICAL (GENERAL) HISTORY







                    Type                Description         Date

 

                    Medical History     Esophageal reflux     

 

                    Medical History     Esophageal reflux     

 

                    Surgical History    No Surgical history information     

 

                    Hospitalization History    pneumonia-as child

## 2019-07-03 NOTE — XMS REPORT
[facility]

                             Created on: 2016



Paul Hillman

External Reference #: [id number]

: 1990

Sex: Male



Demographics







                          Address                   [address] [place] [address]  [address]

 

                          Home Phone                [phone]

 

                          Preferred Language        Unknown

 

                          Marital Status            Unknown

 

                          Voodoo Affiliation     Unknown

 

                          Race                      White

 

                          Ethnic Group              Not  or 





Author







                          Author                    KARTHIK LONG

 

                          [facility]              [employer]

 

                          Address                   Unknown

 

                          Phone                     Unavailable







Care Team Providers







                    Care Team Member Name    Role                Phone

 

                    KARTHIK LONG       CP                  Unavailable



                                                                



Allergies, Adverse Reactions, Alerts

          





                    Substance           Reaction            Event Type

 

                    Wellbutrin Sr 150 Mg Tablet Extended Release    "panic attack"      Non Drug Allergy



                                                                                
       



Problems

          





             Problem Type    Condition    Code         Onset Dates    Condition Status

 

             Problem      Infected lesion in nose    J34.89                    Active

 

             Problem      Gastroesophageal reflux disease without esophagitis    K21.9                     Active

 

             Problem      Acute mucoid otitis media of right ear    H65.111                   Active

 

             Assessment    Gastroesophageal reflux disease without esophagitis    K21.9                     Active



 

             Assessment    Acute mucoid otitis media of right ear    H65.111                   Active

 

             Assessment    Infected lesion in nose    J34.89                    Active



                                                                                
                                                         



Medications

          





        Medication    Code System    Code    Instructions    Start Date    End Date    Status    Dosage



 

           Augmentin    NDC        [address]-45    875-125 MG Orally every 12 hrs    Aug 11, 2016    Aug

 21, 2016                                           1 tablet

 

        Omeprazole    NDC     [address]-07    40 mg Orally Once a day    Aug 11, 2016                    1 capsule





                                                                                
                 



Procedures

          





                Procedure       Coding System    Code            Date

 

                Office Visit, Est Pt., Level 3    CPT-4           45106           Aug 11, 2016



                                                                                
                 



Vital Signs

          





                          Date/Time:                Aug 11, 2016

 

                          Cardiac Monitoring Heart Rate    80 bpm

 

                          Weight                    182.6 lbs

 

                          Height                    67 in

 

                          BMI                       28.60 Index

 

                          Blood Pressure Diastolic    82 mmHg

 

                          Blood Pressure Systolic    130 mmHg



                                                                              



Results

          No Known Results                                                      
             



Summary Purpose

          [employer] Submission

## 2019-07-03 NOTE — XMS REPORT
[facility]

                             Created on: 2018



Paul Hillman

External Reference #: [id number]

: 1990

Sex: Male



Demographics







                          Address                   [address] [place]  [address]

 

                          Preferred Language        Unknown

 

                          Marital Status            Unknown

 

                          Advent Affiliation     Unknown

 

                          Race                      Unknown

 

                          Ethnic Group              Unknown





Author







                          Author                    TEA AGUIRRE

 

                          [facility]

 

                          Address                   [address] [place]  [address]



 

                          Phone                     [phone]







Care Team Providers







                    Care Team Member Name    Role                Phone

 

                    TEA AGUIRRE          Unavailable         [phone]







PROBLEMS







          Type      Condition    ICD9-CM Code    JYK43-EE Code    Onset Dates    Condition Status    SNOMED

 Code

 

           Problem    Gastroesophageal reflux disease without esophagitis               K21.9                 Active

                                        [id number]

 

          Problem    Acute mucoid otitis media of right ear              H65.111              Active    20257465



 

          Problem    Infected lesion in nose              J34.89              Active    [id number]







ALLERGIES







             Substance    Reaction     Event Type    Date         Status

 

                Wellbutrin Sr 150 Mg Tablet Extended Release    "panic attack"    Non Drug Allergy    

                            Active







ENCOUNTERS







                Encounter       Location        Date            Diagnosis

 

                          [facility]    [id number] [place] [address]

                                        Gastroenteritis K52.9

 

                          [facility]     [address] [address]

                          19 Dec, 2017              Cough R05

 

                          [facility]     [address] [address]

                                        Acute nasopharyngitis J00

 

                          [place] [address] [address]

                          28 Aug, 2017              Left upper quadrant pain R10.12

 

                          [facility]     [id number] [place] [address]

                          02 Mar, 2017              Gastroenteritis K52.9 and Gastroesophageal reflux disease without

 esophagitis K21.9

 

                          [facility]     [id number] [place] [address]

                                        Fever, unspecified fever cause R50.9 ; Sore throat J02.9 and Influenza

 J11.1

 

                          [place] [address] [address]

                          11 Aug, 2016              Acute mucoid otitis media of right ear H65.111 ; Infected lesion 

in nose J34.89 and Gastroesophageal reflux disease without esophagitis K21.9

 

                          [facility]    [id number] [address]

                          16 May, 2016              Acute non-recurrent frontal sinusitis J01.10

 

                          [facility]     [id number] [place] [address]

                          27 Aug, 2015              Allergic rhinitis 477.9 ; Sinusitis 473.9 and Heartburn 787.1

 

                          [facility]     [id number] [place] [address]

                                        Gastroenteritis 558.9

 

                          [facility]     [id number] [place] [address]

                                         

 

                          [facility]     [id number] [place] [address]

                                         

 

                          [facility]     [id number] [place] [address]

                          10 Mar, 2015               

 

                          [facility]     [id number] [place] [address]

                          10 Mar, 2015               

 

                          [facility]     [id number] [address]

                          09 Dec, 2014               

 

                          [facility]     [id number] [address]

                          09 Dec, 2014               

 

                          [facility]     [id number] [address]

                          22 Oct, 2014               

 

                          [facility]     [id number] [place] [address]

                          22 Oct, 2014               

 

                          [facility]     [id number] [address]

                                         

 

                          [facility]     [id number] [place] [address]

                                         

 

                          [facility]     [address] [id number] [place] [address]

                                         

 

                          [facility] [place] [facility]     [address] [id number] [place] [address]

                                         

 

                          [facility] [place] [facility]     [address] [id number] [place] [address]

                          16 2014               

 

                          [facility] [place] [facility]     [address] [id number] [place] [address]

                          16 2014               

 

                          [facility] [place] [facility]     [address] [id number] [place] [address]

                          15 2014               

 

                          [facility] [place] [facility]     [address] [id number] [place] [address]

                          15 2014               

 

                          [facility] [place] [facility]     [address] [id number] [place] [address]

                                         

 

                          [facility] [place] [facility]     [address] [id number] [place] [address]

                          17 Dec, 2013               

 

                          [facility] [place] [facility]     [address] [id number] [place] [address]

                          17 Dec, 2013               

 

                          [facility] [place] [facility]     [address] [id number] [place] [address]

                          17 Sep, 2013               

 

                          [facility] [place] [facility]     [address] [id number] [place] [address]

                          09 Sep, 2013               

 

                          [facility] [place] [facility]     [address] [id number] [place] [address]

                          07 Sep, 2013               

 

                          [facility] [place] [facility]     [address] [id number] [place] [address]

                          03 Sep, 2013               

 

                          [facility] [place] [facility]     [address] [id number] [place] [address]

                          02 Sep, 2013               

 

                          [facility] [place] [facility]     [address] [id number] [place] [address]

                          28 Aug, 2013               

 

                          [facility] [place] [facility]     [address] [id number] [place] [address]

                          27 Aug, 2013               

 

                          [facility] [place] [facility]     [address] [id number] [place] [address]

                          10 May, 2013               

 

                          [facility] [place] [facility]     [address] [id number] [place] [address]

                                         

 

                          [facility] [place] [facility]     [address] [id number] [place] [address]

                                         

 

                          [facility] [place] [facility]     [address] [id number] [place] [address]

                                         

 

                          [facility] [place] [facility]     [address] [id number] [place] [address]

                          29 Oct, 2012               

 

                          [facility] [place] [facility]     [address] [id number] [place] [address]

                          29 Oct, 2012               

 

                          [facility] [place] [facility]     [address] [id number] [place] [address]

                          22 Oct, 2012               

 

                          [facility] [place] [facility]     [address] [id number] [place] [address]

                          22 Oct, 2012               

 

                          [facility] [place] [facility]     [address] [id number] [place] [address]

                          15 Oct, 2012               

 

                          [facility] [place] [facility]     [address] [id number] [place] [address]

                          15 Oct, 2012               

 

                          [facility] [place] [facility]     [address] [id number] [place] [address]

                          24 Sep, 2012               

 

                          [facility] [place] [facility]     [address] [id number] [place] [address]

                          15 Sep, 2012               

 

                          [facility] [place] [facility]     [address] [id number] [place] [address]

                          12 Sep, 2012               

 

                          [facility] [place] [facility]     [address] [id number] [place] [address]

                          10 Sep, 2012               

 

                          [facility] [place] [facility]     [address] [id number] [place] [address]

                          09 Sep, 2012               

 

                          [facility] [place] [facility]     [id number] [address]

                          06 Sep, 2012               

 

                          [facility]     [id number] [address]

                          04 Aug, 2012               

 

                          [facility]     [id number] [address]

                                         

 

                          [facility]     [id number] [place] [address]

                                         

 

                          [facility]     [id number] [place] [address]

                                         

 

                          [facility]     [id number] [place] [address]

                                         

 

                          [facility]     [id number] [place] [address]

                                         

 

                          [facility]     [id number] [place] [address]

                                         

 

                          [facility]     [id number] [address]

                          29 Dec, 2010               

 

                          [facility]     [id number] [place] [address]

                          04 Dec, 2010               

 

                          [facility]     [id number] [place] [address]

                                         

 

                          [facility]     [id number] [place] [address]

                          16 Aug, 2010               







IMMUNIZATIONS

No Known Immunizations



SOCIAL HISTORY

Never Assessed



REASON FOR VISIT

vomiting  Pt c/o nausea, heartburn, vomiting and diarrhea for 2 days  LAURA Rosado



PLAN OF CARE







                          Activity                  Details









                                         









                          Follow Up                 prn Reason:







VITAL SIGNS







                    Height              67 in               2018

 

                    Weight              186.2 lbs           2018

 

                    Temperature         98.9 degrees Fahrenheit    2018

 

                    Heart Rate          88 bpm              2018

 

                    Respiratory Rate    22                  2018

 

                    BMI                 29.16 kg/m2         2018

 

                    Blood pressure systolic    126 mmHg            2018

 

                    Blood pressure diastolic    78 mmHg             2018







MEDICATIONS







        Medication    Instructions    Dosage    Frequency    Start Date    End Date    Duration    Status



 

                    Zofran ODT 8 MG     Orally 3 times a day prn    1 tablet on the tongue and allow to dissolve

                                           2 days       Not-Taking

 

                    Flonase 50 mcg/actuation                        1 sprays by Nasal route 2 times per day in each nostril

                          10 Mar, 2015                 90 days      Not-Taking

 

             Zofran ODT 8 MG    Orally q8h as needed    Dissolve one tab under tongue                                                                        Active

 

        DayQuil Multi-Symptom                                                    Not-Taking

 

             Promethazine-Codeine 6.25-10 MG/5ML    Orally every 6 hrs    5 ml as needed    6h           19 

Dec, 2017                                                   Not-Taking

 

                Fluticasone Propionate 50 MCG/ACT    Nasally Once a day    1 spray in each nostril    

24h                                 30 day(s)       Not-Taking

 

             Zofran ODT 8 MG    Orally every 4-6 hours as needed    as directed                 02 Mar, 2017 

                                        2 days              Not-Taking

 

        ibuprofen                                                    Not-Taking







RESULTS

No Results



PROCEDURES

No Known procedures



INSTRUCTIONS





MEDICATIONS ADMINISTERED

No Known Medications



MEDICAL (GENERAL) HISTORY







                    Type                Description         Date

 

                    Medical History     Esophageal reflux     

 

                    Medical History     Esophageal reflux     

 

                    Hospitalization History    pneumonia-as child

## 2019-07-03 NOTE — XMS REPORT
[facility]

                             Created on: 2018



Paul Hillman

External Reference #: [id number]

: 1990

Sex: Male



Demographics







                          Address                   [address] [place]  [address]

 

                          Preferred Language        Unknown

 

                          Marital Status            Unknown

 

                          Hindu Affiliation     Unknown

 

                          Race                      Unknown

 

                          Ethnic Group              Unknown





Author







                          Author                    FAY RAMOS

 

                          [facility]

 

                          Address                   [address] [place]  [address]



 

                          Phone                     [phone]







Care Team Providers







                    Care Team Member Name    Role                Phone

 

                    RACHEL  FAY     Unavailable         [phone]







PROBLEMS







          Type      Condition    ICD9-CM Code    FNT83-KV Code    Onset Dates    Condition Status    SNOMED

 Code

 

           Problem    Gastroesophageal reflux disease without esophagitis               K21.9                 Active

                                        [id number]

 

          Problem    Acute mucoid otitis media of right ear              H65.111              Active    [id number]



 

          Problem    Infected lesion in nose              J34.89              Active    [id number]







ALLERGIES







             Substance    Reaction     Event Type    Date         Status

 

                Wellbutrin Sr 150 Mg Tablet Extended Release    "panic attack"    Non Drug Allergy    

19 Dec, 2017                            Active







ENCOUNTERS







                Encounter       Location        Date            Diagnosis

 

                          [facility]    [id number] [place] [address]

                                        Gastroenteritis K52.9

 

                          [facility]     [address] [address]

                          19 Dec, 2017              Cough R05

 

                          [place] [address] [address]

                                        Acute nasopharyngitis J00

 

                          [facility]     [id number] [place] [address]

                          28 Aug, 2017              Left upper quadrant pain R10.12

 

                          [facility]     [id number] [place] [address]

                          02 Mar, 2017              Gastroenteritis K52.9 and Gastroesophageal reflux disease without

 esophagitis K21.9

 

                          [facility]     [id number] [place] [address]

                                        Fever, unspecified fever cause R50.9 ; Sore throat J02.9 and Influenza

 J11.1

 

                          [place] [address] [place] [address]

                          11 Aug, 2016              Acute mucoid otitis media of right ear H65.111 ; Infected lesion 

in nose J34.89 and Gastroesophageal reflux disease without esophagitis K21.9

 

                          [facility]    [address], [place] [address]

                          16 May, 2016              Acute non-recurrent frontal sinusitis J01.10

 

                          [facility]     [id number] [place] [address]

                          27 Aug, 2015              Allergic rhinitis 477.9 ; Sinusitis 473.9 and Heartburn 787.1

 

                          [facility]     [id number] [place] [address]

                                        Gastroenteritis 558.9

 

                          [facility]     [id number] [place] [address]

                                         

 

                          [facility]     [id number] [place] [address]

                                         

 

                          [facility]     [id number] [place] [address]

                          10 Mar, 2015               

 

                          [facility]     [id number] [place] [address]

                          10 Mar, 2015               

 

                          [facility]     [address] [address]

                          09 Dec, 2014               

 

                          [facility]     [id number] [place] [address]

                          09 Dec, 2014               

 

                          [facility]     [address] [address]

                          22 Oct, 2014               

 

                          [facility]     [id number] [place] [address]

                          22 Oct, 2014               

 

                          [facility]     [id number] [address]

                                         

 

                          [facility]     [id number] [place] [address]

                                         

 

                          [facility] [place] [facility]     [address] [id number] [place] [address]

                                         

 

                          [facility] [place] [facility]     [address] [id number] [place] [address]

                                         

 

                          [facility] [place] [facility]     [address] [id number] [place] [address]

                          16 2014               

 

                          [facility] [place] [facility]     [address] [id number] [place] [address]

                                         

 

                          [facility] [place] [facility]     [address] [id number] [place] [address]

                          15 Elver, 2014               

 

                          [facility] [place] [facility]     [address] [id number] [place] [address]

                          15 2014               

 

                          [facility] [place] [facility]     [address] [id number] [place] [address]

                                         

 

                          [facility] [place] [facility]     [address] [id number] [place] [address]

                          17 Dec, 2013               

 

                          [facility] [place] [facility]     [address] [id number] [place] [address]

                          17 Dec, 2013               

 

                          [facility] [place] [facility]     [address] [id number], [place] [address]

                          17 Sep, 2013               

 

                          [facility] [place] [facility]     [address] [id number], [place] [address]

                          09 Sep, 2013               

 

                          [facility] [place] [facility]     [address] [id number], [place] [address]

                          07 Sep, 2013               

 

                          [facility] [place] [facility]     [address] [id number] [place] [address]

                          03 Sep, 2013               

 

                          [facility] [place] [facility]     [address] [id number] [place] [address]

                          02 Sep, 2013               

 

                          [facility] [place] [facility]     [address] [id number], [place] [address]

                          28 Aug, 2013               

 

                          [facility] [place] [facility]     [address] [id number], [place] [address]

                          27 Aug, 2013               

 

                          [facility] [place] [facility]     [address] [id number] [place] [address]

                          10 May, 2013               

 

                          [facility] [place] [facility]     [address] [id number] [place] [address]

                                         

 

                          [facility] [place] [facility]     [address] [id number] [place] [address]

                                         

 

                          [facility] [place] [facility]     [address] [id number] [place] [address]

                                         

 

                          [facility] [place] [facility]     [address] [id number] [place] [address]

                          29 Oct, 2012               

 

                          [facility] [place] [facility]     [address] [id number] [place] [address]

                          29 Oct, 2012               

 

                          [facility] [place] [facility]     [address] [id number] [place] [address]

                          22 Oct, 2012               

 

                          [facility] [place] [facility]     [address] [id number] [place] [address]

                          22 Oct, 2012               

 

                          [facility] [place] [facility]     [address] [facility] [id number] [place] [address]

                          15 Oct, 2012               

 

                          [facility] [place] [facility]     [address] [facility] [id number] [place] [address]

                          15 Oct, 2012               

 

                          [facility] [place] [facility]     [address] [id number] [place] [address]

                          24 Sep, 2012               

 

                          [facility] [place] [facility]     [address] [id number], [place] [address]

                          15 Sep, 2012               

 

                          [facility] [place] [facility]     [address] [id number] [place] [address]

                          12 Sep, 2012               

 

                          [facility] [place] [facility]     [address] [id number] [place] [address]

                          10 Sep, 2012               

 

                          [facility] [place] [facility]     [address] [facility] [id number] [place] [address]

                          09 Sep, 2012               

 

                          [facility] [place] [facility]     [address] [address]

                          06 Sep, 2012               

 

                          [facility]     [address] [address]

                          04 Aug, 2012               

 

                          [facility]     [address] [address]

                                         

 

                          [facility]     [address] [place] [address]

                                         

 

                          [facility]     [address] [address]

                                         

 

                          [facility]     [id number] [place] [address]

                                         

 

                          [facility]     [address] [address]

                                         

 

                          [facility]     [id number] [place] [address]

                                         

 

                          [facility]     [address] [address]

                          29 Dec, 2010               

 

                          [facility]     [address] [address]

                          04 Dec, 2010               

 

                          [facility]     [address] [address]

                                         

 

                          [facility]     [address] [address]

                          16 Aug, 2010               







IMMUNIZATIONS

No Known Immunizations



SOCIAL HISTORY

Never Assessed



REASON FOR VISIT

cough/sore throat--tcuppettRN, -Cough, congestion, sore throat, fatigue since Fr
iday



PLAN OF CARE







                          Activity                  Details









                                         









                          Follow Up                 prn Reason:







VITAL SIGNS







                    Height              67 in               2017

 

                    Weight              185 lbs             2017

 

                    Temperature         98.7 degrees Fahrenheit    2017

 

                    Heart Rate          84 bpm              2017

 

                    Respiratory Rate    20                  2017

 

                    BMI                 28.97 kg/m2         2017

 

                    Blood pressure systolic    132 mmHg            2017

 

                    Blood pressure diastolic    80 mmHg             2017







MEDICATIONS







        Medication    Instructions    Dosage    Frequency    Start Date    End Date    Duration    Status



 

        DayQuil Multi-Symptom                                                    Active

 

                Fluticasone Propionate 50 MCG/ACT    Nasally Once a day    1 spray in each nostril    

24h                                 30 day(s)       Not-Taking

 

             Zofran ODT 8 MG    Orally every 4-6 hours as needed    as directed                 02 Mar, 2017 

                                        2 days              Not-Taking

 

             Promethazine-Codeine 6.25-10 MG/5ML    Orally every 6 hrs    5 ml as needed    6h           19 

Dec, 2017                                                   Active

 

        ibuprofen                                                    Active

 

                    Flonase 50 mcg/actuation                        1 sprays by Nasal route 2 times per day in each nostril

                          10 Mar, 2015                 90 days      Not-Taking

 

                    Zofran ODT 8 MG     Orally 3 times a day prn    1 tablet on the tongue and allow to dissolve

                                           2 days       Not-Taking







RESULTS







                Name            Result          Date            Reference Range

 

                INFLUENZA A & B (IN HOUSE)                    2017       

 

                INFLUENZA A     Negative                         

 

                INFLUENZA B     Negative                         

 

                Control         +                                

 

                Lot #           [id number]                          

 

                Exp date        2020                           







PROCEDURES







                Procedure       Date Ordered    Result          Body Site

 

                INFLUENZA ASSAY W/OPTIC    Dec 19, 2017                     







INSTRUCTIONS





MEDICATIONS ADMINISTERED

No Known Medications



MEDICAL (GENERAL) HISTORY







                    Type                Description         Date

 

                    Medical History     Esophageal reflux     

 

                    Medical History     Esophageal reflux     

 

                    Hospitalization History    pneumonia-as child

## 2020-05-04 ENCOUNTER — HOSPITAL ENCOUNTER (EMERGENCY)
Dept: HOSPITAL 75 - ER | Age: 30
Discharge: HOME | End: 2020-05-04
Payer: SELF-PAY

## 2020-05-04 VITALS — SYSTOLIC BLOOD PRESSURE: 130 MMHG | DIASTOLIC BLOOD PRESSURE: 89 MMHG

## 2020-05-04 VITALS — BODY MASS INDEX: 27.78 KG/M2 | HEIGHT: 70.87 IN | WEIGHT: 198.42 LBS

## 2020-05-04 DIAGNOSIS — Z79.52: ICD-10-CM
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
## 2020-05-04 DIAGNOSIS — S81.842A: Primary | ICD-10-CM

## 2020-05-04 PROCEDURE — 73590 X-RAY EXAM OF LOWER LEG: CPT
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
## 2020-05-04 NOTE — ED INTEGUMENTARY GENERAL
General


Stated Complaint:  GLASS IN L LEG


Source:  patient


Exam Limitations:  no limitations





History of Present Illness


Date Seen by Provider:  May 4, 2020


Time Seen by Provider:  14:00


Initial Comments


30-year-old male who presents to the emergency room with a small puncture wound 

to his left medial calf area. He reports that he was mowing his grass 3 days ago

when he felt something hit him causing a puncture wound and bleeding to his leg.

He is concerned that he might have a foreign object or possibly glass leg. There

is a small pinpoint puncture wound to the area.


Associated Symptoms:  denies symptoms





Allergies and Home Medications


Allergies


Coded Allergies:  


     No Known Drug Allergies (Unverified , 4/5/11)





Home Medications


Cephalexin 500 Mg Capsule, 500 MG PO TID


   Prescribed by: KEREN HALLMAN on 7/3/19 2044


Cyclobenzaprine HCl 10 Mg Tablet, 10 MG PO HS PRN for SPASMS


   Prescribed by: TRACEE BOWEN on 10/5/16 0936


Prednisone 20 Mg Tab, 40 MG PO DAILY


   Prescribed by: TRACEE BOWEN on 10/5/16 0936





Patient Home Medication List


Home Medication List Reviewed:  Yes





Review of Systems


Review of Systems


Constitutional:  see HPI; No chills, No fever


Skin:  see HPI, other (puncture wound)





All Other Systems Reviewed


Negative Unless Noted:  Yes





Past Medical-Social-Family Hx


Past Med/Social Hx:  Reviewed Nursing Past Med/Soc Hx


Patient Social History


Type Used:  Cigarettes


Recent Foreign Travel:  No


Contact w/Someone Who Travel:  No


Recent Hopitalizations:  No





Seasonal Allergies


Seasonal Allergies:  Yes





Past Medical History


Surgeries:  No


Respiratory:  No


Cardiac:  No


Neurological:  No


Reproductive Disorders:  No


Gastrointestinal:  No


Musculoskeletal:  No


Endocrine:  No


Cancer:  No


Psychosocial:  No


Blood Disorders:  No





Family Medical History


Reviewed Nursing Family Hx


No Pertinent Family Hx





Physical Exam


Vital Signs


Capillary Refill :


General Appearance:  WD/WN, no apparent distress


Cardiovascular:  normal peripheral pulses, regular rate, rhythm, no edema, no 

gallop, no JVD, no murmur


Respiratory:  chest non-tender, lungs clear, normal breath sounds, no 

respiratory distress, no accessory muscle use


Neurologic/Psychiatric:  alert, normal mood/affect, oriented x 3


Skin:  normal color, warm/dry


Skin Problem Location:  lower extremities (left calf)


Skin Problem Character:  other (small pinpoint puncture wound to the left medial

calf.)





Progress/Results/Core Measures


Results/Orders


My Orders





Orders - DONN GILES


Dipht,Pertuss(Acell),Tet Adult (Boostrix (5/4/20 14:00)


Tibia/Fibula, Left, 2 Views (5/4/20 13:55)








Progress


Progress Note :  


   Time:  14:36


Progress Note


Discussed the case with Dr. Daniels at this time. He recommends having the 

patient leave the wire and the leg and having him follow-up in his office. 

Patient agrees with plan of care, plans for discharge, return precautions were 

given.





Departure


Impression





   Primary Impression:  


   puncture wound with foreign object


   Additional Impression:  


   Puncture wound


Disposition:  01 HOME, SELF-CARE


Condition:  Stable/Unchanged





Departure-Patient Inst.


Decision time for Depature:  14:37


Referrals:  


[facility] ([facility])


Primary Care Physician








GONZÁLEZ DANIELS DO


Patient Instructions:  Wound Care (DC)





Add. Discharge Instructions:  


Watch for signs of infection such as increasing redness, swelling, drainage, 

pain. If you have concerns return immediately back to the emergency room. 

Follow-up with Dr. Daniels as we discussed. Call today to schedule an appointment

time. Take antibiotics as directed. Return back to the emergency room for any 

concerns as needed.


Scripts


Amoxicillin/Potassium Clav (Augmentin 875-125 Tablet) 1 Each Tablet


1 EACH PO BID for 7 Days, #14 TAB 0 Refills


   Prov: DONN GILES         5/4/20











DONN GILES                   May 4, 2020 14:02
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
## 2020-05-04 NOTE — XMS REPORT
[facility]

                             Created on: 2019



RafyPaul

External Reference #: [id number]

: 1990

Sex: Male



Demographics





                          Address                   [address] [place] [address]

 

                          Preferred Language        Unknown

 

                          Marital Status            Unknown

 

                          Mormon Affiliation     Unknown

 

                          Race                      Unknown

 

                          Ethnic Group              Unknown





Author





                          Author                    Paul AG

[facility]

 

                          Organization              [facility]

 

                          Address                   [address] [place]  [address]



 

                          Phone                     [phone]







Care Team Providers





                    Care Team Member Name Role                Phone

 

                    LIZETH TOTHELIDA Unavailable         [phone]







PROBLEMS





          Type      Condition ICD9-CM Code ZBE57-NO Code Onset Dates Condition S

tatus SNOMED 

Code

 

          Problem   Sinusitis           J32.9               Active    [id number]







ALLERGIES

No Information



ENCOUNTERS





                Encounter       Location        Date            Diagnosis

 

                          [place] [address] [address]

                          10 May, 2019              Irritant contact dermatitis 

due to plants, except food L24.7

 

                          [address] [address]

                                        Allergic contact dermatitis 

due to plants, except food L23.7

 

                          [facility]  [address] 

[address]                              Sinusitis J32.9

 

                          [facility] [address] 

[address]                              Acute sinusitis J01.90 ; Sor

e throat J02.9 and Flu-like 

symptoms R68.89

 

                          [facility]     [id number]

[address] [place] [address]

                          11 Sep, 2018              Acute bronchitis, unspecifie

d organism J20.9

 

                          [place] [address] [address]

                                        Spasm of thoracic back muscl

e M62.830

 

                          [facility]  [address] 

[address]                              Gastroenteritis K52.9

 

                          [facility]     [address] [address]

                          19 Dec, 2017              Cough R05

 

                          [place] [address] [address]

                                        Acute nasopharyngitis J00

 

                          [facility]     [address] [address]

                          28 Aug, 2017              Left upper quadrant pain R10

.12

 

                          [facility]     [address] [address]

                          02 Mar, 2017              Gastroenteritis K52.9 and Ga

stroesophageal reflux disease without 

esophagitis K21.9

 

                          [place] [address] [address]

                                        Fever, unspecified fever cau

se R50.9 ; Sore throat J02.9 and 

Influenza J11.1

 

                          [address] [address]

                          11 Aug, 2016              Acute mucoid otitis media of

 right ear H65.111 ; Infected lesion in

nose J34.89 and Gastroesophageal reflux disease without esophagitis K21.9

 

                          [facility]  [address] 

[address]                16 May, 2016              Acute non-recurrent frontal 

sinusitis J01.10

 

                          [place] [address] [address]

                          27 Aug, 2015              Allergic rhinitis 477.9 ; Si

nusitis 473.9 and Heartburn 787.1

 

                          [facility]     [id number]

[address] [place] [address]

                                        Gastroenteritis 558.9

 

                          [place] [address] [address]

                          14 2015               

 

                          [facility]     [address] [address]

                          13 2015               

 

                          [facility]     [address] [address]

                          10 Mar, 2015               

 

                          [facility]     [address] [id number]

[address] [place] [address], [place] [address]

                          10 Mar, 2015               

 

                          [facility]     [address] [id number]

[address] [place] [address], [place] [address]

                          09 Dec, 2014               

 

                          [facility]     [address] [id number]

[address] [place] [address], [place] [address]

                          09 Dec, 2014               

 

                          [facility]     [address] [id number]

[address] [place] [address], [place] [address]

                          22 Oct, 2014               

 

                          [facility]     [address] [id number]

[address] [place] [address], [place] [address]

                          22 Oct, 2014               

 

                          [facility]     [address] [id number]

[address] [place] [address], [place] [address]

                                         

 

                          [facility]     [address] [id number]

[address] [place] [address], [place] [address]

                                         

 

                          [facility]     [address] [id number]

[address] [place] [address], [place] [address]

                                         

 

                          [facility]     [address] [id number]

[address] [place] [address], [place] [address]

                                         

 

                          [facility]     [address] [id number]

[address] [place] [address], [place] [address]

                          16 2014               

 

                          [facility]     [address] [id number]

[address] [place] [address], [place] [address]

                          16 2014               

 

                          [facility]     [address] [id number]

[address] [place] [address], [place] [address]

                          15 2014               

 

                          [facility]     [address] [id number]

[address] [place] [address], [place] [address]

                          15 2014               

 

                          [facility]     [address] [id number]

[address] [place] [address], [place] [address]

                          13 2014               

 

                          [facility]     [address] [id number]

[address] [place] [address], [place] [address]

                          17 Dec, 2013               

 

                          [facility]     [address] [id number]

[address] [place] [address], [place] [address]

                          17 Dec, 2013               

 

                          [facility]     [address] [id number]

[address] [place] [address], [place] [address]

                          17 Sep,                

 

                          [facility]     [address] [id number]

[address] [place] [address], [place] [address]

                          09 Sep, 2013               

 

                          [facility]     [address] [id number]

[address] [place] [address], [place] [address]

                          07 Sep,                

 

                          [facility]     [address] [id number]

[address] [place] [address], [place] [address]

                          03 Sep, 2013               

 

                          [facility]     [address] [id number]

[address] [place] [address], [place] [address]

                          02 Sep, 2013               

 

                          [facility]     [address] [id number]

[address] [place] [address], [place] [address]

                          28 Aug, 2013               

 

                          [facility]     [address] [id number]

[address] [place] [address], [place] [address]

                          27 Aug, 2013               

 

                          [facility]     [address] [id number]

[address] [place] [address], [place] [address]

                          10 May, 2013               

 

                          [facility]     [address] [id number]

[address] [place] [address], [place] [address]

                                         

 

                          [facility]     [address] [id number]

[address] [place] [address], [place] [address]

                                         

 

                          [facility]     [address] [id number]

[address] [place] [address], [place] [address]

                                         

 

                          [facility]     [address] [id number]

[address] [place] [address], [place] [address]

                          29 Oct, 2012               

 

                          [facility]     [address] [id number]

[address] [place] [address], [place] [address]

                          29 Oct, 2012               

 

                          [facility]     [address] [id number]

[address] [place] [address], [place] [address]

                          22 Oct, 2012               

 

                          [facility]     [address] [id number]

[address] [place] [address], [place] [address]

                          22 Oct, 2012               

 

                          [facility]     [address] [id number]

[address] [place] [address], [place] [address]

                          15 Oct, 2012               

 

                          [facility]     [address] [id number]

[address] [place] [address], [place] [address]

                          15 Oct, 2012               

 

                          [facility]     [address] [id number]

[address] [place] [address], [place] [address]

                          24 Sep, 2012               

 

                          [facility]     [address] [id number]

[address] [place] [address], [place] [address]

                          15 Sep, 2012               

 

                          [facility]     [address] [id number]

[address] [place] [address], [place] [address]

                          12 Sep, 2012               

 

                          [facility]     [address] [id number]

[address] [place] [address], [place] [address]

                          10 Sep, 2012               

 

                          [facility]     [address] [id number]

[address] [place] [address], [place] [address]

                          09 Sep, 2012               

 

                          [facility]     [address] [id number]

[address] [place] [address], [place] [address]

                          06 Sep, 2012               

 

                          [facility]     [address] [id number]

[address] [place] [address], [place] [address]

                          04 Aug, 2012               

 

                          [facility]     [address] [id number]

[address] [place] [address], [place] [address]

                                         

 

                          [facility]     [address] [id number]

[address] [place] [address], [place] [address]

                                         

 

                          [facility]     [address] [id number]

[address] [place] [address], [place] [address]

                                         

 

                          [facility]     [address] [id number]

[address] [place] [address], [place] [address]

                                         

 

                          [facility]     [address] [id number]

[address] [place] [address], [place] [address]

                                         

 

                          [facility]     [address] [id number]

[address] [place] [address], [place] [address]

                                         

 

                          [facility]     [address] [id number]

[address] [place] [address]

                          29 Dec, 2010               

 

                          [facility]     [address] [facility] [id number]

[address] [place] [address]

                          04 Dec, 2010               

 

                          [facility]     [address] [facility] [id number]

[address] [place] [address]

                                         

 

                          [facility]     [address] [facility] [id number]

[address] [place] [address]

                          16 Aug, 2010               







IMMUNIZATIONS

No Known Immunizations



SOCIAL HISTORY

Never Assessed



REASON FOR VISIT





PLAN OF CARE





VITAL SIGNS





                    Height              67 in               2015-03-10

 

                    Weight              156.1 lbs           2015-03-10

 

                    Temperature         98.2 degrees Fahrenheit 2015-03-10

 

                    Heart Rate          64 bpm              2015-03-10

 

                    Respiratory Rate    16                  2015-03-10

 

                    Blood pressure systolic 100 mmHg            2015-03-10

 

                    Blood pressure diastolic 64 mmHg             2015-03-10







MEDICATIONS

No Known Medications



RESULTS

No Results



PROCEDURES

No Known procedures



INSTRUCTIONS





MEDICATIONS ADMINISTERED

No Known Medications



MEDICAL (GENERAL) HISTORY





                    Type                Description         Date

 

                    Medical History     Esophageal reflux    

 

                    Medical History     Esophageal reflux    

 

                    Surgical History    No Surgical history information  

 

                    Hospitalization History pneumonia-as child

## 2020-05-04 NOTE — XMS REPORT
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                        381

.00           

ACUTE NONSUPPURATIVE OTITIS MEDIA UNSPECIFIED                    

 

             10/22/2014           KENDY SHEETS DO K                        381.00

           

ACUTE NONSUPPURATIVE OTITIS MEDIA UNSPECIFIED                    

 

                10/22/2014           BRANDON LEE, JONELLE R                      

     381.00         

                          ACUTE NONSUPPURATIVE OTITIS MEDIA UNSPECIFIED         

           

 

             2014           KENDY SHEETS DO K                        558.9 

          

OTHER AND UNSPECIFIED NONINFECTIOUS GASTROENTERITIS AND COLITIS                 

  

 

                2014           BRANDON LEE JONELLE R                      

     558.9          

                          OTHER AND UNSPECIFIED NONINFECTIOUS GASTROENTERITIS AN

D COLITIS                 

  

 

                03/10/2015           BRANDON LEE, JONELLE R                      

     372.00         

                          ACUTE CONJUNCTIVITIS UNSPECIFIED                    

 

                03/10/2015           BRANDON LEE JONELLE R                      

     473.9          

                          UNSPECIFIED SINUSITIS (CHRONIC)                    

 

                03/10/2015           BRANDON LEE, JONELLE R                      

     478.19         

                          OTHER DISEASES OF NASAL CAVITY AND SINUSES            

        

 

                2015           BRANDON LEE, JONELLE R                      

     724.2          

                          LUMBAGO/ LOW BACK PAIN                    

 

                10/05/2016           CESARIO BOX, TRACEE T           Ot      

        M54.2      

                          CERVICALGIA                        

 

                10/05/2016           CESARIO BOX, TRACEE T           Ot      

        M62.838    

                          OTHER MUSCLE SPASM                    

 

                10/06/2016           TRACEE NASSAR MD T           Ot      

        M54.2      

                          CERVICALGIA                        

 

                10/06/2016           CESARIO BOX, TRACEE T           Ot      

        M62.838    

                          OTHER MUSCLE SPASM                    

 

                2019           KEREN HALLMAN           Ot           

   F17.210         

                          NICOTINE DEPENDENCE, CIGARETTES, UNCOMPL              

      

 

                2019           KEREN HALLMAN           Ot           

   R40.2142        

                          COMA SCALE, EYES OPEN, SPONTANEOUS, EMR               

     

 

                2019           KEREN HALLMAN           Ot           

   R40.2252        

                          COMA SCALE, BEST VERBAL RESPONSE, ORIENT              

      

 

                2019           KEREN HALLMAN           Ot           

   R40.2362        

                          COMA SCALE, BEST MOTOR RESPONSE, OBEYS C              

      

 

                2019           KEREN HALLMAN           Ot           

   S06.0X0A        

                          CONCUSSION WITHOUT LOSS OF CONSCIOUSNESS              

      

 

                2019           KEREN HALLMAN           Ot           

   S09.90XA        

                          UNSPECIFIED INJURY OF HEAD, INITIAL ENCO              

      

 

                2019           KEREN HALLMAN           Ot           

   S30.811A        

                          ABRASION OF ABDOMINAL WALL, INITIAL ENCO              

      

 

                2019           KEREN HALLMAN           Ot           

   S40.811A        

                          ABRASION OF RIGHT UPPER ARM, INITIAL ENC              

      

 

                2019           KEREN HALLMAN           Ot           

   S50.811A        

                          ABRASION OF RIGHT FOREARM, INITIAL ENCOU              

      

 

                2019           KEREN HALLMAN           Ot           

   S60.512A        

                          ABRASION OF LEFT HAND, INITIAL ENCOUNTER              

      

 

                2019           KEREN HALLMAN           Ot           

   S80.211A        

                          ABRASION, RIGHT KNEE, INITIAL ENCOUNTER               

     

 

                2019           KEREN HALLMAN           Ot           

   S80.212A        

                          ABRASION, LEFT KNEE, INITIAL ENCOUNTER                

    

 

                2019           KEREN HALLMAN           Ot           

   V28.4XXA        

                          MTRCY  INJURED IN NONCLSN TRNSP AC              

      

 

                2019           KEREN HALLMAN           Ot           

   F17.210         

                          NICOTINE DEPENDENCE, CIGARETTES, UNCOMPL              

      

 

                2019           KEREN HALLMAN           Ot           

   R40.2142        

                          COMA SCALE, EYES OPEN, SPONTANEOUS, EMR               

     

 

                2019           KEREN HALLMAN           Ot           

   R40.2252        

                          COMA SCALE, BEST VERBAL RESPONSE, ORIENT              

      

 

                2019           KEREN HALLMAN           Ot           

   R40.2362        

                          COMA SCALE, BEST MOTOR RESPONSE, OBEYS C              

      

 

                2019           KEREN HALLMAN           Ot           

   S06.0X0A        

                          CONCUSSION WITHOUT LOSS OF CONSCIOUSNESS              

      

 

                2019           KEREN HALLMAN           Ot           

   S09.90XA        

                          UNSPECIFIED INJURY OF HEAD, INITIAL ENCO              

      

 

                2019           KEREN HALLMAN           Ot           

   S30.811A        

                          ABRASION OF ABDOMINAL WALL, INITIAL ENCO              

      

 

                2019           KEREN HALLMAN           Ot           

   S40.811A        

                          ABRASION OF RIGHT UPPER ARM, INITIAL ENC              

      

 

                2019           KEREN HALLMAN           Ot           

   S50.811A        

                          ABRASION OF RIGHT FOREARM, INITIAL ENCOU              

      

 

                2019           KEREN HALLMAN           Ot           

   S60.512A        

                          ABRASION OF LEFT HAND, INITIAL ENCOUNTER              

      

 

                2019           HALLMAN, PETER J APRN           Ot           

   S80.211A        

                          ABRASION, RIGHT KNEE, INITIAL ENCOUNTER               

     

 

                2019           KEREN HALLMAN APRN           Ot           

   S80.212A        

                          ABRASION, LEFT KNEE, INITIAL ENCOUNTER                

    

 

                2019           KEREN HALLMAN APRN           Ot           

   V28.4XXA        

                          MTRCY  INJURED IN NONCLSN [facility] AC              

      



                                                                                
                                                                                
                                                                                
                                                                                
                                                                      



Procedures

      



                Code            Description           Performed By           Per

formed On        

 

                                      [id number]                                 UA W

/ CULTURE IF INDICATED    

                                                    2013        

 

                                      71641                                 GC/C

HLAM URINE (STATE)        

                                                    2013        

 

                                      16050                                 UA W

/ CULTURE IF INDICATED    

                                                    2013        

 

                                      UROLO                                 TAWI

L, TR                  

                                                    2013        

 

                                      [id number]                                 STRE

P A  (IN-HOUSE)           

                                                    05/10/2013        

 

                                      [id number]                                 CULT

URE UROGENITAL            

                                                    2013        

 

                                93036                                 GC CURE   

                    

                                        2013        

 

                                      54524                                 CULT

URE CHLAMYDIA (OR CURE)   

                                                    2013        

 

                                      [id number]                                 CYTO

/MOLECULAR REPORT-OUTSIDE 

LAB                                                 2013        

 

                                      [id number]                                 THER

APUTIC INJ SQ/IM          

                                                    2014        

 

                                                                       ROCE

PHIN  mg           

                                                    2014        

 

                                      78261                                 XRAY

 CERVICAL SPINE, 2 OR 3 

VIEWS                                               2015        



                                        



Results

      



                    Test                Result              Range        

 

                                        CBC With Differential/Platelet - 

7 10:08         

 

                    WBC                 9.1 x10E3/uL           3.4-10.8        

 

                    RBC                 5.37 x10E6/uL           4.14-5.80       

 

 

                    Hemoglobin           16.3 g/dL           12.6-17.7        

 

                    Hematocrit           47.2 %              37.5-51.0        

 

                    MCV                 88 fL               79-97        

 

                    MCH                 30.4 pg             26.6-33.0        

 

                    MCHC                34.5 g/dL           31.5-35.7        

 

                    RDW                 13.5 %              12.3-15.4        

 

                    Platelets           220 x10E3/uL           150-379        

 

                    Neutrophils           48 %                         

 

                    Lymphs              39 %                         

 

                    Monocytes           7 %                          

 

                    Eos                 5 %                          

 

                    Basos               0 %                          

 

                    Neutrophils (Absolute)           4.4 x10E3/uL           1.4-

7.0        

 

                    Lymphs (Absolute)           3.6 x10E3/uL           0.7-3.1  

      

 

                    Monocytes(Absolute)           0.6 x10E3/uL           0.1-0.9

        

 

                    Eos (Absolute)           0.5 x10E3/uL           0.0-0.4     

   

 

                    Baso (Absolute)           0.0 x10E3/uL           0.0-0.2    

    

 

                    Immature Granulocytes           1 %                         

 

 

                    Immature Grans (Abs)           0.1 x10E3/uL           0.0-0.

1        

 

                                        Basic Metabolic Panel (8) - 17 10:

08         

 

                    Glucose, Serum           93 mg/dL            65-99        

 

                    BUN                 14 mg/dL            6-20        

 

                    Creatinine, Serum           0.78 mg/dL           0.76-1.27  

      

 

                    eGFR If NonAfricn Am           122 mL/min/1.73              

 >59        

 

                    eGFR If Africn Am           141 mL/min/1.73               >5

9        

 

                    BUN/Creatinine Ratio           18                  9-20     

   

 

                    Sodium, Serum           140 mmol/L           134-144        

 

                    Potassium, Serum           4.4 mmol/L           3.5-5.2     

   

 

                    Chloride, Serum           102 mmol/L                  

 

 

                    Carbon Dioxide, Total           22 mmol/L           18-29   

     

 

                    Calcium, Serum           9.3 mg/dL           8.7-10.2       

 



                  



Encounters

      



                ACCT No.           Visit Date/Time           Discharge          

 Status         

             Pt. Type           Provider           Facility           Loc./Unit 

          

Complaint        

 

             [id number]           2017 08:06:00                         

            

Document Registration                                                           

         

 

                [id number]           07/10/2019 13:00:00           07/10/2019 23:59:5

9           CLS 

                Outpatient           SANDRA DOMINIQUE LAC                                             

 

                    [id number]           2019 19:07:00           

019 21:08:00        

                DIS             Emergency           KEREN HALLMAN APRN         

  [facility]           ER                        WRECKED MOPED,MULTIPLE 

LACERATIONS  

     

 

                    [id number]           10/05/2016 07:43:00           10/05/2

016 09:56:00        

                DIS             Emergency           TRACEE NASSAR MD    

       [facility] 

[facility]           ER                        NECK PAIN      

  

 

             [id number]           2012 02:38:00                         

            

Document Registration                                                           

         

 

                [id number]           2015 16:31:00           2015 23:59:

59           CLS

                Outpatient           JONELLE PRAKASH                      

          

                                                 

 

                [id number]           2014 09:51:00           2014 23:59:

59           CLS

                Outpatient           KENDY SHEETS DO                           

          

                                                 

 

                [id number]           10/22/2014 09:39:00           10/22/2014 23:59:

59           CLS

                Outpatient           RACHEL BOX, FAY SEARS                        

          

                                                 

 

                [id number]           2014 18:01:00           2014 23:59:

59           CLS

                Outpatient           KENDY SHEETS DO                           

          

                                                 

 

                [id number]           2014 16:40:00           2014 23:59:

59           CLS

                Outpatient           KENDY SHEETS DO                           

          

                                                 

 

                [id number]           2013 10:40:00           2013 23:59:

59           CLS

                Outpatient           KENDY SHEETS DO                           

          

                                                 

 

                [id number]           2013 09:48:00           2013 23:59:

59           CLS

             Outpatient                                                         

  

 

                [id number]           2013 12:25:00           2013 23:59:

59           CLS

             Outpatient                                                         

  

 

                [id number]           2013 17:40:00           2013 23:59:

59           CLS

             Outpatient                                                         

  

 

             [id number]           2013 18:12:00                               

      Document 

Registration                                                                    

 

             [id number]           05/10/2013 11:27:00                               

      Document 

Registration
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
## 2020-05-08 ENCOUNTER — HOSPITAL ENCOUNTER (EMERGENCY)
Dept: HOSPITAL 75 - ER | Age: 30
Discharge: HOME | End: 2020-05-08
Payer: SELF-PAY

## 2020-05-08 VITALS — DIASTOLIC BLOOD PRESSURE: 87 MMHG | SYSTOLIC BLOOD PRESSURE: 127 MMHG

## 2020-05-08 VITALS — BODY MASS INDEX: 29.33 KG/M2 | HEIGHT: 65.75 IN | WEIGHT: 180.34 LBS

## 2020-05-08 DIAGNOSIS — S81.832A: Primary | ICD-10-CM
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
## 2020-05-08 DIAGNOSIS — Z79.52: ICD-10-CM

## 2020-05-08 PROCEDURE — 76999 ECHO EXAMINATION PROCEDURE: CPT

## 2020-05-08 NOTE — DIAGNOSTIC IMAGING REPORT
INDICATION: Foreign body.



COMPARISON: 05/04/2020.



TECHNIQUE: Targeted ultrasound of the left calf region was

performed on May 8, 2020. 



FINDINGS: No focal fluid collection or solid mass lesion. No

foreign body identified.



IMPRESSION: 

1. Unremarkable examination. In particular, no suspicious foreign

body identified. Recommend correlation with radiographs as a

foreign body was noted at this location on recent radiography.

2. No focal fluid collection. 



Dictated by: 



  Dictated on workstation # [id number]
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
